# Patient Record
Sex: FEMALE | Race: WHITE | NOT HISPANIC OR LATINO | Employment: FULL TIME
[De-identification: names, ages, dates, MRNs, and addresses within clinical notes are randomized per-mention and may not be internally consistent; named-entity substitution may affect disease eponyms.]

---

## 2017-06-10 ENCOUNTER — HEALTH MAINTENANCE LETTER (OUTPATIENT)
Age: 47
End: 2017-06-10

## 2018-06-16 ENCOUNTER — HEALTH MAINTENANCE LETTER (OUTPATIENT)
Age: 48
End: 2018-06-16

## 2019-01-15 ENCOUNTER — TRANSFERRED RECORDS (OUTPATIENT)
Dept: HEALTH INFORMATION MANAGEMENT | Facility: CLINIC | Age: 49
End: 2019-01-15

## 2019-01-15 LAB
HPV ABSTRACT: NORMAL
PAP-ABSTRACT: NORMAL

## 2019-09-30 ENCOUNTER — HEALTH MAINTENANCE LETTER (OUTPATIENT)
Age: 49
End: 2019-09-30

## 2020-02-11 ENCOUNTER — TRANSFERRED RECORDS (OUTPATIENT)
Dept: HEALTH INFORMATION MANAGEMENT | Facility: CLINIC | Age: 50
End: 2020-02-11

## 2020-02-11 LAB
HPV ABSTRACT: NORMAL
PAP-ABSTRACT: NORMAL

## 2020-02-21 ENCOUNTER — OFFICE VISIT (OUTPATIENT)
Dept: OBGYN | Facility: OTHER | Age: 50
End: 2020-02-21
Attending: OBSTETRICS & GYNECOLOGY
Payer: COMMERCIAL

## 2020-02-21 VITALS
BODY MASS INDEX: 21.97 KG/M2 | HEIGHT: 67 IN | SYSTOLIC BLOOD PRESSURE: 118 MMHG | DIASTOLIC BLOOD PRESSURE: 78 MMHG | HEART RATE: 56 BPM | WEIGHT: 140 LBS

## 2020-02-21 DIAGNOSIS — R87.810 CERVICAL HIGH RISK HPV (HUMAN PAPILLOMAVIRUS) TEST POSITIVE: ICD-10-CM

## 2020-02-21 DIAGNOSIS — Z98.890 STATUS POST COLPOSCOPY: ICD-10-CM

## 2020-02-21 DIAGNOSIS — R87.613 HGSIL ON CYTOLOGIC SMEAR OF CERVIX: ICD-10-CM

## 2020-02-21 PROCEDURE — 57456 ENDOCERV CURETTAGE W/SCOPE: CPT | Performed by: OBSTETRICS & GYNECOLOGY

## 2020-02-21 PROCEDURE — 88305 TISSUE EXAM BY PATHOLOGIST: CPT | Mod: TC | Performed by: OBSTETRICS & GYNECOLOGY

## 2020-02-21 PROCEDURE — 99202 OFFICE O/P NEW SF 15 MIN: CPT | Mod: 25 | Performed by: OBSTETRICS & GYNECOLOGY

## 2020-02-21 RX ORDER — FLUOCINONIDE TOPICAL SOLUTION USP, 0.05% 0.5 MG/ML
SOLUTION TOPICAL
COMMUNITY
Start: 2020-02-11 | End: 2020-02-21

## 2020-02-21 ASSESSMENT — PAIN SCALES - GENERAL: PAINLEVEL: NO PAIN (0)

## 2020-02-21 ASSESSMENT — MIFFLIN-ST. JEOR: SCORE: 1297.67

## 2020-02-21 NOTE — NURSING NOTE
"Chief Complaint   Patient presents with     Consult     Jatinder       Initial /78   Pulse 56   Ht 1.702 m (5' 7\")   Wt 63.5 kg (140 lb)   BMI 21.93 kg/m   Estimated body mass index is 21.93 kg/m  as calculated from the following:    Height as of this encounter: 1.702 m (5' 7\").    Weight as of this encounter: 63.5 kg (140 lb).  Medication Reconciliation: complete  Suzanne Hoover LPN    "

## 2020-02-21 NOTE — PROGRESS NOTES
CONSULT for Aarti Villanueva for COLPO due to HGSIL pap and HRHpv+      S:   Patient has HGSIL pap done 2/2020.  She also has a HRhpv+ 2/2020  Previous pap 1/2019 was normal.  However, she had a HRhpv+ 1/2019  She denies any previous hx of cervical disease or procedures.  She is for COLPO  She has no complaints.      Natasha Rios is a 49 year old female who presents for: initial colposcopy.    Pap smear 1 months ago showed: HGSIL and with high risk HPV present.   The prior pap showed normal and with high risk HPV present: 1/2019.     No LMP recorded.   UPT today is not done    PMHx/Fam hx/PSHX reviewed and unchanged from EMR.    ROS:  Neg GI/    Patient does not smoke    Type of contraception: none  Past GYN history:  No STD history  Prior cervical/vaginal disease: none.  Tobacco no      PROCEDURE:  Before the procedure, it was ensured that the patient was educated regarding the nature of her findings to date, the implications, and what was to be done. She has been made aware of the role of HPV, the natural history of infection, ways to minimize her future risk, the effect of HPV on the cervix, and treatment options available should they be indicated.  The details of the colposcopic procedure were reviewed.  All questions were answered before proceeding, and informed consent was therefore obtained.    Speculum placed in vagina and excellent visualization of cervix   acheived, cervix swabbed x 3 with acetic acid.     Colposcopy was satisfactory and the entire transformation zone seen.    FINDINGS:  Cervix: abnormal vessels at 10, 12, 1  Unable to biopsy due to shape of cervix and biopsy instrument would not grab.    Pap repeated?:  Yes Rigorous ECC  SCJ seen?:  yes    ECC done?:  Yes via rigorous pap ECC  Satisfactory examination?:  yes      ASSESSMENT:    HGSIL pap  HRhpv+ since 1/2019  Abnormality of COLPO exam        PLAN:    Multilevel LEEP  3/26/2020  Immune stimulation principles discussed.  Condom  use for one full year, then will retest HRhpv.  preop scheduled  3/25/2020  Discussed LEEP in detail.

## 2020-02-24 ENCOUNTER — PREP FOR PROCEDURE (OUTPATIENT)
Dept: OBGYN | Facility: OTHER | Age: 50
End: 2020-02-24

## 2020-02-24 ENCOUNTER — HOSPITAL ENCOUNTER (OUTPATIENT)
Facility: HOSPITAL | Age: 50
End: 2020-02-24
Attending: OBSTETRICS & GYNECOLOGY | Admitting: OBSTETRICS & GYNECOLOGY
Payer: COMMERCIAL

## 2020-02-24 DIAGNOSIS — R87.613 HGSIL ON PAP SMEAR OF CERVIX: ICD-10-CM

## 2020-02-24 DIAGNOSIS — R87.613 HGSIL ON PAP SMEAR OF CERVIX: Primary | ICD-10-CM

## 2020-02-25 LAB — COPATH REPORT: NORMAL

## 2020-03-20 ENCOUNTER — ANESTHESIA EVENT (OUTPATIENT)
Dept: SURGERY | Facility: HOSPITAL | Age: 50
End: 2020-03-20

## 2020-03-20 RX ORDER — LABETALOL 20 MG/4 ML (5 MG/ML) INTRAVENOUS SYRINGE
10
Status: CANCELLED | OUTPATIENT
Start: 2020-03-20

## 2020-03-20 RX ORDER — SODIUM CHLORIDE, SODIUM LACTATE, POTASSIUM CHLORIDE, CALCIUM CHLORIDE 600; 310; 30; 20 MG/100ML; MG/100ML; MG/100ML; MG/100ML
INJECTION, SOLUTION INTRAVENOUS CONTINUOUS
Status: CANCELLED | OUTPATIENT
Start: 2020-03-20

## 2020-03-20 RX ORDER — NALOXONE HYDROCHLORIDE 0.4 MG/ML
.1-.4 INJECTION, SOLUTION INTRAMUSCULAR; INTRAVENOUS; SUBCUTANEOUS
Status: CANCELLED | OUTPATIENT
Start: 2020-03-20 | End: 2020-03-21

## 2020-03-20 RX ORDER — MEPERIDINE HYDROCHLORIDE 25 MG/ML
12.5 INJECTION INTRAMUSCULAR; INTRAVENOUS; SUBCUTANEOUS
Status: CANCELLED | OUTPATIENT
Start: 2020-03-20

## 2020-03-20 RX ORDER — HYDROMORPHONE HYDROCHLORIDE 1 MG/ML
.3-.5 INJECTION, SOLUTION INTRAMUSCULAR; INTRAVENOUS; SUBCUTANEOUS EVERY 10 MIN PRN
Status: CANCELLED | OUTPATIENT
Start: 2020-03-20

## 2020-03-20 RX ORDER — ONDANSETRON 4 MG/1
4 TABLET, ORALLY DISINTEGRATING ORAL EVERY 30 MIN PRN
Status: CANCELLED | OUTPATIENT
Start: 2020-03-20

## 2020-03-20 RX ORDER — FENTANYL CITRATE 50 UG/ML
25-50 INJECTION, SOLUTION INTRAMUSCULAR; INTRAVENOUS
Status: CANCELLED | OUTPATIENT
Start: 2020-03-20

## 2020-03-20 RX ORDER — ALBUTEROL SULFATE 0.83 MG/ML
2.5 SOLUTION RESPIRATORY (INHALATION) EVERY 4 HOURS PRN
Status: CANCELLED | OUTPATIENT
Start: 2020-03-20

## 2020-03-20 RX ORDER — ONDANSETRON 2 MG/ML
4 INJECTION INTRAMUSCULAR; INTRAVENOUS EVERY 30 MIN PRN
Status: CANCELLED | OUTPATIENT
Start: 2020-03-20

## 2020-03-20 RX ORDER — LIDOCAINE 40 MG/G
CREAM TOPICAL
Status: CANCELLED | OUTPATIENT
Start: 2020-03-20

## 2020-03-20 NOTE — ANESTHESIA PREPROCEDURE EVALUATION
Anesthesia Pre-Procedure Evaluation    Patient: Natasha Rios   MRN: [id number] : 1970          Preoperative Diagnosis: HGSIL on Pap smear of cervix [R87.613]    Procedure(s):  loop electrosurgical excision procedure    Past Medical History:   Diagnosis Date     Abnormal Pap smear     at age 21 yrs, had colposcopy with biospy. Normal      Anxiety     on citalopram since      Basal cell cancer     removed forehead     Rosacea      S/P breast implant, saline     normal mammogram in      Past Surgical History:   Procedure Laterality Date     C NONSPECIFIC PROCEDURE  2006    breast implants      CL AFF SURGICAL PATHOLOGY      at age 21 yrs, for abnormal pap. normal psp since then     LASIK       TONSILLECTOMY         Anesthesia Evaluation     . Pt has had prior anesthetic.            ROS/MED HX    ENT/Pulmonary:  - neg pulmonary ROS     Neurologic:  - neg neurologic ROS     Cardiovascular:     (+) Dyslipidemia, ----. : . . . :. .       METS/Exercise Tolerance:     Hematologic:         Musculoskeletal:  - neg musculoskeletal ROS       GI/Hepatic:  - neg GI/hepatic ROS       Renal/Genitourinary:  - ROS Renal section negative       Endo:  - neg endo ROS       Psychiatric:     (+) psychiatric history anxiety      Infectious Disease:         Malignancy:   (+) Malignancy History of Skin  Skin CA status post Surgery,         Other:                                 Lab Results   Component Value Date    WBC 4.3 10/24/2013    HGB 14.5 10/24/2013    HCT 42.8 10/24/2013     10/24/2013     10/24/2013    POTASSIUM 4.3 10/24/2013    CHLORIDE 107 10/24/2013    CO2 26 10/24/2013    BUN 14 10/24/2013    CR 0.72 10/24/2013    GLC 92 10/24/2013    MANUEL 9.3 10/24/2013    ALBUMIN 4.2 10/24/2013    PROTTOTAL 7.2 10/24/2013    ALT 29 10/24/2013    AST 22 10/24/2013    GGT 12 10/24/2013    ALKPHOS 44 10/24/2013    BILITOTAL 0.8 10/24/2013    LIPASE 67 10/24/2013    PTT 30 10/24/2013    INR 1.14 10/24/2013  "   TSH 1.76 07/27/2009    HCGS Negative 10/24/2013       Preop Vitals  BP Readings from Last 3 Encounters:   02/21/20 118/78   10/24/13 101/67   10/24/13 117/71    Pulse Readings from Last 3 Encounters:   02/21/20 56   10/24/13 68   10/24/13 63      Resp Readings from Last 3 Encounters:   10/24/13 20    SpO2 Readings from Last 3 Encounters:   10/24/13 100%   10/24/13 100%      Temp Readings from Last 1 Encounters:   10/24/13 97.6  F (36.4  C) (Oral)    Ht Readings from Last 1 Encounters:   02/21/20 1.702 m (5' 7\")      Wt Readings from Last 1 Encounters:   02/21/20 63.5 kg (140 lb)    Estimated body mass index is 21.93 kg/m  as calculated from the following:    Height as of 2/21/20: 1.702 m (5' 7\").    Weight as of 2/21/20: 63.5 kg (140 lb).       Anesthesia Plan          Plan for     Need H and P  Ordered HCG        Postoperative Care      Consents                 Kat Eduardo, APRN CRNA  "

## 2020-03-26 ENCOUNTER — ANESTHESIA (OUTPATIENT)
Dept: SURGERY | Facility: HOSPITAL | Age: 50
End: 2020-03-26

## 2020-05-15 ENCOUNTER — TELEPHONE (OUTPATIENT)
Dept: OBGYN | Facility: OTHER | Age: 50
End: 2020-05-15

## 2020-05-15 NOTE — TELEPHONE ENCOUNTER
Yes please schedule with me first available surgical date and if she is comfortable with proceeding I will talk to her the day of surgery.  Please schedule preop and Covid testing.

## 2020-05-15 NOTE — TELEPHONE ENCOUNTER
Pt. Was scheduled for Leep with Dr. Norman back in March which he deemed necessary due to her HGSIL pap. Pt. Ended up with a low-grade fever so her surgery was cancelled. Pt. Has called several times wanting to get this rescheduled. Will you do the surgery instead? I am here today but if you don't see this until Monday please send to [facility] if you will do the surgery so it can be scheduled.   Thank you  Suzanne Hoover LPN

## 2020-05-18 ENCOUNTER — PREP FOR PROCEDURE (OUTPATIENT)
Dept: OBGYN | Facility: OTHER | Age: 50
End: 2020-05-18

## 2020-05-18 DIAGNOSIS — R87.613 HGSIL ON PAP SMEAR OF CERVIX: Primary | ICD-10-CM

## 2020-06-01 ENCOUNTER — TRANSFERRED RECORDS (OUTPATIENT)
Dept: HEALTH INFORMATION MANAGEMENT | Facility: HOSPITAL | Age: 50
End: 2020-06-01

## 2020-06-04 ENCOUNTER — ANESTHESIA EVENT (OUTPATIENT)
Dept: SURGERY | Facility: HOSPITAL | Age: 50
End: 2020-06-04
Payer: COMMERCIAL

## 2020-06-04 NOTE — ANESTHESIA PREPROCEDURE EVALUATION
Anesthesia Pre-Procedure Evaluation    Patient: Natasha Rios   MRN: [id number] : 1970          Preoperative Diagnosis: HGSIL on Pap smear of cervix [R87.613]    Procedure(s):  LOOP ELECTROSURGICAL EXCISION PROCEDURE    Past Medical History:   Diagnosis Date     Abnormal Pap smear     at age 21 yrs, had colposcopy with biospy. Normal      Anxiety     on citalopram since      Basal cell cancer     removed forehead     Rosacea      S/P breast implant, saline     normal mammogram in      Past Surgical History:   Procedure Laterality Date     C NONSPECIFIC PROCEDURE  2006    breast implants      CL AFF SURGICAL PATHOLOGY      at age 21 yrs, for abnormal pap. normal psp since then     LASIK       TONSILLECTOMY         Anesthesia Evaluation     . Pt has had prior anesthetic. Type: General           ROS/MED HX    ENT/Pulmonary:  - neg pulmonary ROS     Neurologic:  - neg neurologic ROS     Cardiovascular:  - neg cardiovascular ROS   (+) ----. : . . . :. . No previous cardiac testing       METS/Exercise Tolerance:  >4 METS   Hematologic:  - neg hematologic  ROS       Musculoskeletal:  - neg musculoskeletal ROS       GI/Hepatic:  - neg GI/hepatic ROS       Renal/Genitourinary:  - ROS Renal section negative       Endo:  - neg endo ROS       Psychiatric:     (+) psychiatric history anxiety      Infectious Disease:  - neg infectious disease ROS       Malignancy:   (+) Malignancy History of Skin  Skin CA status post Surgery,         Other:    (+) No chance of pregnancy C-spine cleared: N/A, no H/O Chronic Pain,no other significant disability                         Physical Exam  Normal systems: cardiovascular, pulmonary and dental    Airway   Mallampati: I  TM distance: >3 FB  Neck ROM: full    Dental     Cardiovascular   Rhythm and rate: regular and normal      Pulmonary    breath sounds clear to auscultation            Lab Results   Component Value Date    WBC 4.3 10/24/2013    HGB 14.5 10/24/2013  "   HCT 42.8 10/24/2013     10/24/2013     10/24/2013    POTASSIUM 4.3 10/24/2013    CHLORIDE 107 10/24/2013    CO2 26 10/24/2013    BUN 14 10/24/2013    CR 0.72 10/24/2013    GLC 92 10/24/2013    MANUEL 9.3 10/24/2013    ALBUMIN 4.2 10/24/2013    PROTTOTAL 7.2 10/24/2013    ALT 29 10/24/2013    AST 22 10/24/2013    GGT 12 10/24/2013    ALKPHOS 44 10/24/2013    BILITOTAL 0.8 10/24/2013    LIPASE 67 10/24/2013    PTT 30 10/24/2013    INR 1.14 10/24/2013    TSH 1.76 07/27/2009    HCGS Negative 10/24/2013       Preop Vitals  BP Readings from Last 3 Encounters:   02/21/20 118/78   10/24/13 101/67   10/24/13 117/71    Pulse Readings from Last 3 Encounters:   02/21/20 56   10/24/13 68   10/24/13 63      Resp Readings from Last 3 Encounters:   10/24/13 20    SpO2 Readings from Last 3 Encounters:   10/24/13 100%   10/24/13 100%      Temp Readings from Last 1 Encounters:   10/24/13 97.6  F (36.4  C) (Oral)    Ht Readings from Last 1 Encounters:   02/21/20 1.702 m (5' 7\")      Wt Readings from Last 1 Encounters:   02/21/20 63.5 kg (140 lb)    Estimated body mass index is 21.93 kg/m  as calculated from the following:    Height as of 2/21/20: 1.702 m (5' 7\").    Weight as of 2/21/20: 63.5 kg (140 lb).       Anesthesia Plan      History & Physical Review  History and physical reviewed and following examination; no interval change.    ASA Status:  2 .        Plan for MAC with Intravenous and Propofol induction. Maintenance will be TIVA.  Reason for MAC:  Deep or markedly invasive procedure (G8) and Extreme anxiety (QS)  PONV prophylaxis:  Ondansetron (or other 5HT-3) and Dexamethasone or Solumedrol  Hp 6/1/20        Postoperative Care  Postoperative pain management:  IV analgesics.      Consents  Anesthetic plan, risks, benefits and alternatives discussed with:  Patient..                 Ethan Godoy, MAX CRNA  "

## 2020-06-07 ENCOUNTER — IMMUNIZATION (OUTPATIENT)
Dept: FAMILY MEDICINE | Facility: OTHER | Age: 50
End: 2020-06-07
Attending: OBSTETRICS & GYNECOLOGY
Payer: COMMERCIAL

## 2020-06-07 DIAGNOSIS — Z01.818 PREOP GENERAL PHYSICAL EXAM: Primary | ICD-10-CM

## 2020-06-07 PROCEDURE — 87635 SARS-COV-2 COVID-19 AMP PRB: CPT | Mod: ZL | Performed by: OBSTETRICS & GYNECOLOGY

## 2020-06-07 PROCEDURE — 99000 SPECIMEN HANDLING OFFICE-LAB: CPT | Performed by: OBSTETRICS & GYNECOLOGY

## 2020-06-07 NOTE — LETTER
June 9, 2020        Natasha LESTER Osbaldo  [address]   [place] [address]    This letter provides a written record that you were tested for COVID-19 on 6/8/20.   Your result was negative.    This means that we didn t find the virus that causes COVID-19 in your sample. A test may show negative when you do actually have the virus. This can happen when the virus is in the early stages of infection, before you feel illness symptoms.    Even if you don t have symptoms, they may still appear. For safety, it s very important to follow these rules.    Keep yourself away from others (self-isolation):      Stay home. Don t go to work, school or anywhere else.     Stay in your own room (and use your own bathroom), if you can.    Stay away from others in your home. No hugging, kissing or shaking hands. No visitors.    Clean  high touch  surfaces often (doorknobs, counters, handles, etc.). Use a household cleaning spray or wipes.    Cover your mouth and nose with a mask, tissue or washcloth to avoid spreading germs.    Wash your hands and face often with soap and water.    Stay in self-isolation until you meet ALL of the guidelines below:    1. You have had no fever for at least 72 hours (that is 3 full days of no fever without the use of medicine that reduces fevers), AND  2. other symptoms (such as cough, shortness of breath) have gotten better, AND  3. at least 10 days have passed since your symptoms first appeared.    Going back to work  Check with your employer for any guidelines to follow for going back to work.    Employers: This document serves as formal notice that your employee tested negative for COVID-19, as of the testing date shown above.    For questions regarding this letter or your Negative COVID-19 result, call [phone] between 8A to 6:30P (M-F) and 10A to 6:30P (weekends).

## 2020-06-08 LAB
SARS-COV-2 RNA SPEC QL NAA+PROBE: NOT DETECTED
SPECIMEN SOURCE: NORMAL

## 2020-06-10 ENCOUNTER — HOSPITAL ENCOUNTER (OUTPATIENT)
Facility: HOSPITAL | Age: 50
Discharge: HOME | End: 2020-06-10
Attending: OBSTETRICS & GYNECOLOGY | Admitting: OBSTETRICS & GYNECOLOGY
Payer: COMMERCIAL

## 2020-06-10 ENCOUNTER — ANESTHESIA (OUTPATIENT)
Dept: SURGERY | Facility: HOSPITAL | Age: 50
End: 2020-06-10
Payer: COMMERCIAL

## 2020-06-10 ENCOUNTER — APPOINTMENT (OUTPATIENT)
Dept: LAB | Facility: HOSPITAL | Age: 50
End: 2020-06-10
Attending: NURSE PRACTITIONER
Payer: COMMERCIAL

## 2020-06-10 VITALS
OXYGEN SATURATION: 100 % | WEIGHT: 135 LBS | HEART RATE: 55 BPM | RESPIRATION RATE: 16 BRPM | SYSTOLIC BLOOD PRESSURE: 134 MMHG | BODY MASS INDEX: 21.19 KG/M2 | TEMPERATURE: 96.9 F | DIASTOLIC BLOOD PRESSURE: 68 MMHG | HEIGHT: 67 IN

## 2020-06-10 DIAGNOSIS — Z98.890 POST-OPERATIVE STATE: Primary | ICD-10-CM

## 2020-06-10 DIAGNOSIS — R87.613 HGSIL ON PAP SMEAR OF CERVIX: ICD-10-CM

## 2020-06-10 LAB — HCG UR QL: NEGATIVE

## 2020-06-10 PROCEDURE — 25000132 ZZH RX MED GY IP 250 OP 250 PS 637: Performed by: OBSTETRICS & GYNECOLOGY

## 2020-06-10 PROCEDURE — 25800030 ZZH RX IP 258 OP 636: Performed by: NURSE ANESTHETIST, CERTIFIED REGISTERED

## 2020-06-10 PROCEDURE — 25000125 ZZHC RX 250: Performed by: OBSTETRICS & GYNECOLOGY

## 2020-06-10 PROCEDURE — 81025 URINE PREGNANCY TEST: CPT | Performed by: NURSE ANESTHETIST, CERTIFIED REGISTERED

## 2020-06-10 PROCEDURE — 25000125 ZZHC RX 250: Performed by: NURSE ANESTHETIST, CERTIFIED REGISTERED

## 2020-06-10 PROCEDURE — 57522 CONIZATION OF CERVIX: CPT | Performed by: NURSE ANESTHETIST, CERTIFIED REGISTERED

## 2020-06-10 PROCEDURE — 25000128 H RX IP 250 OP 636: Performed by: NURSE ANESTHETIST, CERTIFIED REGISTERED

## 2020-06-10 PROCEDURE — 25000128 H RX IP 250 OP 636: Performed by: OBSTETRICS & GYNECOLOGY

## 2020-06-10 PROCEDURE — 88305 TISSUE EXAM BY PATHOLOGIST: CPT | Mod: TC | Performed by: OBSTETRICS & GYNECOLOGY

## 2020-06-10 PROCEDURE — 37000009 ZZH ANESTHESIA TECHNICAL FEE, EACH ADDTL 15 MIN: Performed by: OBSTETRICS & GYNECOLOGY

## 2020-06-10 PROCEDURE — 37000008 ZZH ANESTHESIA TECHNICAL FEE, 1ST 30 MIN: Performed by: OBSTETRICS & GYNECOLOGY

## 2020-06-10 PROCEDURE — 36000050 ZZH SURGERY LEVEL 2 1ST 30 MIN: Performed by: OBSTETRICS & GYNECOLOGY

## 2020-06-10 PROCEDURE — 40000305 ZZH STATISTIC PRE PROC ASSESS I: Performed by: OBSTETRICS & GYNECOLOGY

## 2020-06-10 PROCEDURE — 27210794 ZZH OR GENERAL SUPPLY STERILE: Performed by: OBSTETRICS & GYNECOLOGY

## 2020-06-10 PROCEDURE — 57522 CONIZATION OF CERVIX: CPT | Performed by: OBSTETRICS & GYNECOLOGY

## 2020-06-10 PROCEDURE — 36000052 ZZH SURGERY LEVEL 2 EA 15 ADDTL MIN: Performed by: OBSTETRICS & GYNECOLOGY

## 2020-06-10 PROCEDURE — 71000027 ZZH RECOVERY PHASE 2 EACH 15 MINS: Performed by: OBSTETRICS & GYNECOLOGY

## 2020-06-10 RX ORDER — HYDROMORPHONE HYDROCHLORIDE 1 MG/ML
.3-.5 INJECTION, SOLUTION INTRAMUSCULAR; INTRAVENOUS; SUBCUTANEOUS EVERY 10 MIN PRN
Status: DISCONTINUED | OUTPATIENT
Start: 2020-06-10 | End: 2020-06-10 | Stop reason: HOSPADM

## 2020-06-10 RX ORDER — OXYCODONE HYDROCHLORIDE 5 MG/1
5 TABLET ORAL
Status: DISCONTINUED | OUTPATIENT
Start: 2020-06-10 | End: 2020-06-10 | Stop reason: HOSPADM

## 2020-06-10 RX ORDER — FENTANYL CITRATE 50 UG/ML
25-50 INJECTION, SOLUTION INTRAMUSCULAR; INTRAVENOUS
Status: DISCONTINUED | OUTPATIENT
Start: 2020-06-10 | End: 2020-06-10 | Stop reason: HOSPADM

## 2020-06-10 RX ORDER — MEPERIDINE HYDROCHLORIDE 25 MG/ML
12.5 INJECTION INTRAMUSCULAR; INTRAVENOUS; SUBCUTANEOUS
Status: DISCONTINUED | OUTPATIENT
Start: 2020-06-10 | End: 2020-06-10 | Stop reason: HOSPADM

## 2020-06-10 RX ORDER — IBUPROFEN 600 MG/1
600 TABLET, FILM COATED ORAL EVERY 6 HOURS PRN
Qty: 30 TABLET | Refills: 0 | Status: SHIPPED | OUTPATIENT
Start: 2020-06-10 | End: 2020-07-30

## 2020-06-10 RX ORDER — ONDANSETRON 4 MG/1
4 TABLET, ORALLY DISINTEGRATING ORAL EVERY 30 MIN PRN
Status: DISCONTINUED | OUTPATIENT
Start: 2020-06-10 | End: 2020-06-10 | Stop reason: HOSPADM

## 2020-06-10 RX ORDER — HYDROXYZINE HYDROCHLORIDE 25 MG/1
25 TABLET, FILM COATED ORAL
Status: DISCONTINUED | OUTPATIENT
Start: 2020-06-10 | End: 2020-06-10 | Stop reason: HOSPADM

## 2020-06-10 RX ORDER — PROPOFOL 10 MG/ML
INJECTION, EMULSION INTRAVENOUS PRN
Status: DISCONTINUED | OUTPATIENT
Start: 2020-06-10 | End: 2020-06-10

## 2020-06-10 RX ORDER — NALOXONE HYDROCHLORIDE 0.4 MG/ML
.1-.4 INJECTION, SOLUTION INTRAMUSCULAR; INTRAVENOUS; SUBCUTANEOUS
Status: DISCONTINUED | OUTPATIENT
Start: 2020-06-10 | End: 2020-06-10 | Stop reason: HOSPADM

## 2020-06-10 RX ORDER — LIDOCAINE HYDROCHLORIDE 10 MG/ML
INJECTION, SOLUTION INFILTRATION; PERINEURAL PRN
Status: DISCONTINUED | OUTPATIENT
Start: 2020-06-10 | End: 2020-06-10 | Stop reason: HOSPADM

## 2020-06-10 RX ORDER — FENTANYL CITRATE 50 UG/ML
INJECTION, SOLUTION INTRAMUSCULAR; INTRAVENOUS PRN
Status: DISCONTINUED | OUTPATIENT
Start: 2020-06-10 | End: 2020-06-10

## 2020-06-10 RX ORDER — LIDOCAINE HYDROCHLORIDE 20 MG/ML
INJECTION, SOLUTION INFILTRATION; PERINEURAL PRN
Status: DISCONTINUED | OUTPATIENT
Start: 2020-06-10 | End: 2020-06-10

## 2020-06-10 RX ORDER — SODIUM CHLORIDE, SODIUM LACTATE, POTASSIUM CHLORIDE, CALCIUM CHLORIDE 600; 310; 30; 20 MG/100ML; MG/100ML; MG/100ML; MG/100ML
INJECTION, SOLUTION INTRAVENOUS CONTINUOUS
Status: DISCONTINUED | OUTPATIENT
Start: 2020-06-10 | End: 2020-06-10 | Stop reason: HOSPADM

## 2020-06-10 RX ORDER — KETOROLAC TROMETHAMINE 30 MG/ML
30 INJECTION, SOLUTION INTRAMUSCULAR; INTRAVENOUS ONCE
Status: COMPLETED | OUTPATIENT
Start: 2020-06-10 | End: 2020-06-10

## 2020-06-10 RX ORDER — ONDANSETRON 2 MG/ML
4 INJECTION INTRAMUSCULAR; INTRAVENOUS EVERY 30 MIN PRN
Status: DISCONTINUED | OUTPATIENT
Start: 2020-06-10 | End: 2020-06-10 | Stop reason: HOSPADM

## 2020-06-10 RX ORDER — LIDOCAINE 40 MG/G
CREAM TOPICAL
Status: DISCONTINUED | OUTPATIENT
Start: 2020-06-10 | End: 2020-06-10 | Stop reason: HOSPADM

## 2020-06-10 RX ORDER — ACETAMINOPHEN 325 MG/1
975 TABLET ORAL ONCE
Status: COMPLETED | OUTPATIENT
Start: 2020-06-10 | End: 2020-06-10

## 2020-06-10 RX ORDER — IODINE AND POTASSIUM IODIDE 50; 100 MG/ML; MG/ML
LIQUID ORAL PRN
Status: DISCONTINUED | OUTPATIENT
Start: 2020-06-10 | End: 2020-06-10 | Stop reason: HOSPADM

## 2020-06-10 RX ORDER — ONDANSETRON 4 MG/1
4 TABLET, ORALLY DISINTEGRATING ORAL
Status: DISCONTINUED | OUTPATIENT
Start: 2020-06-10 | End: 2020-06-10 | Stop reason: HOSPADM

## 2020-06-10 RX ADMIN — ACETAMINOPHEN 975 MG: 325 TABLET, FILM COATED ORAL at 08:19

## 2020-06-10 RX ADMIN — PROPOFOL 50 MG: 10 INJECTION, EMULSION INTRAVENOUS at 09:21

## 2020-06-10 RX ADMIN — SODIUM CHLORIDE, POTASSIUM CHLORIDE, SODIUM LACTATE AND CALCIUM CHLORIDE: 600; 310; 30; 20 INJECTION, SOLUTION INTRAVENOUS at 08:17

## 2020-06-10 RX ADMIN — KETOROLAC TROMETHAMINE 30 MG: 30 INJECTION, SOLUTION INTRAMUSCULAR; INTRAVENOUS at 08:19

## 2020-06-10 RX ADMIN — PROPOFOL 20 MG: 10 INJECTION, EMULSION INTRAVENOUS at 09:38

## 2020-06-10 RX ADMIN — PROPOFOL 50 MG: 10 INJECTION, EMULSION INTRAVENOUS at 09:24

## 2020-06-10 RX ADMIN — FENTANYL CITRATE 100 MCG: 50 INJECTION, SOLUTION INTRAMUSCULAR; INTRAVENOUS at 09:07

## 2020-06-10 RX ADMIN — MIDAZOLAM 2 MG: 1 INJECTION INTRAMUSCULAR; INTRAVENOUS at 09:07

## 2020-06-10 RX ADMIN — PROPOFOL 20 MG: 10 INJECTION, EMULSION INTRAVENOUS at 09:42

## 2020-06-10 RX ADMIN — PROPOFOL 50 MG: 10 INJECTION, EMULSION INTRAVENOUS at 09:15

## 2020-06-10 RX ADMIN — PROPOFOL 50 MG: 10 INJECTION, EMULSION INTRAVENOUS at 09:18

## 2020-06-10 RX ADMIN — PROPOFOL 20 MG: 10 INJECTION, EMULSION INTRAVENOUS at 09:34

## 2020-06-10 RX ADMIN — LIDOCAINE HYDROCHLORIDE 40 MG: 20 INJECTION, SOLUTION INFILTRATION; PERINEURAL at 09:11

## 2020-06-10 RX ADMIN — PROPOFOL 50 MG: 10 INJECTION, EMULSION INTRAVENOUS at 09:30

## 2020-06-10 RX ADMIN — PROPOFOL 50 MG: 10 INJECTION, EMULSION INTRAVENOUS at 09:11

## 2020-06-10 ASSESSMENT — MIFFLIN-ST. JEOR: SCORE: 1274.99

## 2020-06-10 NOTE — ANESTHESIA POSTPROCEDURE EVALUATION
Patient: Natasha Rios    Procedure(s):  LOOP ELECTROSURGICAL EXCISION PROCEDURE    Diagnosis:HGSIL on Pap smear of cervix [R87.613]  Diagnosis Additional Information: No value filed.    Anesthesia Type:  MAC    Note:  Anesthesia Post Evaluation    Patient location during evaluation: Phase 2  Patient participation: Able to fully participate in evaluation  Level of consciousness: awake and alert  Pain management: adequate  Airway patency: patent  Cardiovascular status: acceptable  Respiratory status: acceptable  Hydration status: stable  PONV: none     Anesthetic complications: None          Last vitals:  Vitals:    06/10/20 1025 06/10/20 1030 06/10/20 1035   BP: 105/85 130/78 134/68   Pulse: 66 53 55   Resp: 16 16 16   Temp:   96.9  F (36.1  C)   SpO2: 100% 99% 100%         Electronically Signed By: MAX Posada CRNA  Jacquelyn 10, 2020  11:05 AM

## 2020-06-10 NOTE — ANESTHESIA CARE TRANSFER NOTE
Patient: Natasha Rios    Procedure(s):  LOOP ELECTROSURGICAL EXCISION PROCEDURE    Diagnosis: HGSIL on Pap smear of cervix [R87.613]  Diagnosis Additional Information: No value filed.    Anesthesia Type:   MAC     Note:  Airway :Nasal Cannula  Patient transferred to:Phase II  Handoff Report: Identifed the Patient, Identified the Reponsible Provider, Reviewed the pertinent medical history, Discussed the surgical course, Reviewed Intra-OP anesthesia mangement and issues during anesthesia, Set expectations for post-procedure period and Allowed opportunity for questions and acknowledgement of understanding      Vitals: (Last set prior to Anesthesia Care Transfer)    CRNA VITALS  6/10/2020 0919 - 6/10/2020 0958      6/10/2020             Resp Rate (set):  8                Electronically Signed By: MAX Posada CRNA  Jacquelyn 10, 2020  9:58 AM

## 2020-06-10 NOTE — INTERVAL H&P NOTE
History and physical and previous evaluations by Dr. Norman reviewed on 6/10/2020.  Patient examined. No interval change in condition.  History and physical reviewed on 6/10/2020.   Patient examined. No interval change in condition.  Reviewed goals, risks, alternatives for planned procedure.  Including risk of bleeding, infection, damage to nerves, blood vessels, bowel and bladder. Discussed recovery period and expected discomfort.      Consent form reviewed with patient and signed.  All questions answered.  Pt desires to proceed.          Leo Alonzo MD  9:18 AM

## 2020-06-10 NOTE — DISCHARGE INSTRUCTIONS
Call MD prior to DC.  No driving today or while on pain meds  Pelvic rest for 4 weeks  Call Dr. Alonzo [phone] as necessary if problems in interim, no post op appt needed.          Post-Anesthesia Patient Instructions    IMMEDIATELY FOLLOWING SURGERY:  Do not drive or operate machinery for the first twenty four hours after surgery.  Do not make any important decisions for twenty four hours after surgery or while taking narcotic pain medications or sedatives.  If you develop intractable nausea and vomiting or a severe headache please notify your doctor immediately.    FOLLOW-UP:  Please make an appointment with your surgeon as instructed. You do not need to follow up with anesthesia unless specifically instructed to do so.    WOUND CARE INSTRUCTIONS (if applicable):  Keep a dry clean dressing on the anesthesia/puncture wound site if there is drainage.  Once the wound has quit draining you may leave it open to air.  Generally you should leave the bandage intact for twenty four hours unless there is drainage.  If the epidural site drains for more than 36-48 hours please call the anesthesia department.    QUESTIONS?:  Please feel free to call your physician or the hospital  if you have any questions, and they will be happy to assist you.

## 2020-06-10 NOTE — OP NOTE
[facility]  Operative Note    Pre-operative diagnosis: HGSIL on Pap smear of cervix [R87.613]   Post-operative diagnosis Same, Pathology Pending   Procedure: Procedure(s):  LOOP ELECTROSURGICAL EXCISION PROCEDURE   Surgeon:  Assistant: Leo Alonzo MD  None   Anesthesia: Combined MAC with Local, Local paracervical block    Estimated blood loss: Less than 10 ml       Drains: None   Specimens: LEEP cut at 12 O'clock.  LEEP endocervical pass  ECC   Findings: Nonstaining areas of cervical dysplasia with application of Lugol's solutions   Complications: None   Condition: Stable       OPERATIVE DICTATION:  The patientwas brought to the operating room and uneventfully placed under  anesthesia. She was prepped and draped in the dorsal lithotomy position and her bladder drained. A coated LEEP speculum was placed to visualize the cervix. The cervix was soaked with dilute acetic acid and then Lugol solution to identify the areas of dysplasia. A cervical/paracervical block was performed with 1% Lidocaine.   A 2 cm LEEP wand was chosen and using 50 shane of pure cutting current, a LEEP procedure performed in 2 passes. ECC was performed. Rollerball cautery was used at the margins and Monsel solution was applied to the cervical bed with resulting excellent hemostasis. The instruments were removed. There were no complications and the patient was transferred to the recovery room in excellent stable condition.   Leo Alonzo MD  6/10/2020  9:19 AM

## 2020-06-10 NOTE — OR NURSING
Patient and responsible adult given discharge instructions with no questions regarding instructions. Esther score 20. Pain level 0/10.  Discharged from unit via wheelchair. Patient discharged to home with friend mitzi.

## 2020-06-11 LAB — COPATH REPORT: NORMAL

## 2020-07-22 ENCOUNTER — TELEPHONE (OUTPATIENT)
Dept: OBGYN | Facility: OTHER | Age: 50
End: 2020-07-22

## 2020-07-22 NOTE — TELEPHONE ENCOUNTER
Pt had LEEP in June 2020. States she has been spotting, brown to red sometimes with clots, on and off since 7/11/20. Denies any vaginal discharge, itching or burning. Denies pain or cramping. LMP was 6/22/20. Please advise

## 2020-07-30 ENCOUNTER — OFFICE VISIT (OUTPATIENT)
Dept: OBGYN | Facility: OTHER | Age: 50
End: 2020-07-30
Attending: OBSTETRICS & GYNECOLOGY
Payer: COMMERCIAL

## 2020-07-30 VITALS
BODY MASS INDEX: 21.19 KG/M2 | HEART RATE: 61 BPM | TEMPERATURE: 98.8 F | OXYGEN SATURATION: 98 % | SYSTOLIC BLOOD PRESSURE: 118 MMHG | HEIGHT: 67 IN | WEIGHT: 135 LBS | DIASTOLIC BLOOD PRESSURE: 70 MMHG

## 2020-07-30 DIAGNOSIS — Z98.890 POST-OPERATIVE STATE: Primary | ICD-10-CM

## 2020-07-30 PROCEDURE — 99024 POSTOP FOLLOW-UP VISIT: CPT | Performed by: OBSTETRICS & GYNECOLOGY

## 2020-07-30 ASSESSMENT — PAIN SCALES - GENERAL: PAINLEVEL: NO PAIN (0)

## 2020-07-30 ASSESSMENT — MIFFLIN-ST. JEOR: SCORE: 1274.99

## 2020-07-30 NOTE — PROGRESS NOTES
"SIRENA Rios is a 49 year old female presents for post operative check. She is  6  week(s) status post LEEP.  She reports doing well and denies significant pain.  She did have some AUB From Jacquelyn 10 following intercourse and up until her next period. Bowel and bladder function is satisfactory.  Significant findings CIN3    O.  Blood pressure 118/70, pulse 61, temperature 98.8  F (37.1  C), temperature source Tympanic, height 1.702 m (5' 7\"), weight 61.2 kg (135 lb), SpO2 98 %.    Abd: soft, non-tender, non-distended.Cervix:  clear,  intact without evidence of infection.  No lesions.      A. /P. Satisfactory post-op check.  Released from restrictions.    F/u prn problems or in 4 months for f/u Pap smear.     Leo Alonzo MD  "

## 2020-07-30 NOTE — NURSING NOTE
"Chief Complaint   Patient presents with     Follow Up     pt had LEEP on 6/10/20 and has been having spotting for about a month       Initial /70 (BP Location: Left arm, Cuff Size: Adult Regular)   Pulse 61   Temp 98.8  F (37.1  C) (Tympanic)   Ht 1.702 m (5' 7\")   Wt 61.2 kg (135 lb)   SpO2 98%   BMI 21.14 kg/m   Estimated body mass index is 21.14 kg/m  as calculated from the following:    Height as of this encounter: 1.702 m (5' 7\").    Weight as of this encounter: 61.2 kg (135 lb).  Medication Reconciliation: complete  Jewels Izaguirre LPN  "

## 2020-12-08 ENCOUNTER — OFFICE VISIT (OUTPATIENT)
Dept: OBGYN | Facility: OTHER | Age: 50
End: 2020-12-08
Attending: OBSTETRICS & GYNECOLOGY
Payer: COMMERCIAL

## 2020-12-08 VITALS
DIASTOLIC BLOOD PRESSURE: 63 MMHG | OXYGEN SATURATION: 100 % | HEART RATE: 49 BPM | WEIGHT: 135 LBS | BODY MASS INDEX: 21.19 KG/M2 | HEIGHT: 67 IN | SYSTOLIC BLOOD PRESSURE: 100 MMHG

## 2020-12-08 DIAGNOSIS — D06.9 SEVERE DYSPLASIA OF CERVIX: ICD-10-CM

## 2020-12-08 DIAGNOSIS — N93.9 VAGINAL BLEEDING: Primary | ICD-10-CM

## 2020-12-08 LAB
SPECIMEN SOURCE: NORMAL
WET PREP SPEC: NORMAL

## 2020-12-08 PROCEDURE — 99213 OFFICE O/P EST LOW 20 MIN: CPT | Performed by: OBSTETRICS & GYNECOLOGY

## 2020-12-08 PROCEDURE — 87624 HPV HI-RISK TYP POOLED RSLT: CPT | Performed by: OBSTETRICS & GYNECOLOGY

## 2020-12-08 PROCEDURE — 87210 SMEAR WET MOUNT SALINE/INK: CPT | Performed by: OBSTETRICS & GYNECOLOGY

## 2020-12-08 PROCEDURE — 88142 CYTOPATH C/V THIN LAYER: CPT | Mod: TC | Performed by: OBSTETRICS & GYNECOLOGY

## 2020-12-08 ASSESSMENT — PAIN SCALES - GENERAL: PAINLEVEL: NO PAIN (0)

## 2020-12-08 ASSESSMENT — MIFFLIN-ST. JEOR: SCORE: 1274.99

## 2020-12-08 NOTE — PROGRESS NOTES
"S:  F/u cervical DPA  See my prior evals.  Pt 6 months s/p LEEP for CIN3.  Denies pelvic pain, abnormal discharge.  Some spotting prior to menses which are regular.  No other co's.         Patient Active Problem List   Diagnosis     Rosacea     CARDIOVASCULAR SCREENING; LDL GOAL LESS THAN 160     Anxiety     HGSIL on cytologic smear of cervix--pap normal-1/2019, Now HGSIL--2/2020     Cervical high risk HPV (human papillomavirus) test positive-HRhpv+---1/2019, 2/2020     Status post colposcopy for HGSIL & HRhpv+--2/2020,   ECC is negative---2/2020     HGSIL on Pap smear of cervix            Past Medical History:   Diagnosis Date     Abnormal Pap smear     at age 21 yrs, had colposcopy with biospy. Normal      Anxiety     on citalopram since 2010     Basal cell cancer 2013    removed forehead     Rosacea      S/P breast implant, saline 2006    normal mammogram in 2007            Past Surgical History:   Procedure Laterality Date     CL AFF SURGICAL PATHOLOGY      at age 21 yrs, for abnormal pap. normal psp since then     CONIZATION LEEP N/A 6/10/2020    Procedure: LOOP ELECTROSURGICAL EXCISION PROCEDURE;  Surgeon: Leo Alonzo MD;  Location: [facility]     LASIK       TONSILLECTOMY       [facility]  2006    breast implants             Social History     Tobacco Use     Smoking status: Never Smoker     Smokeless tobacco: Never Used   Substance Use Topics     Alcohol use: Not on file            Family History   Problem Relation Age of Onset     Cancer Mother         liver             No Known Allergies         No current outpatient medications on file.          Review Of Systems  Constitutional:  Denies fever  GI/ negative except as noted per hpi    O:   /63 (BP Location: Left arm, Cuff Size: Adult Regular)   Pulse (!) 49   Ht 1.702 m (5' 7\")   Wt 61.2 kg (135 lb)   SpO2 100%   BMI 21.14 kg/m    Gen:  NAD, A and O    Abd soft, NT, ND  Lymphadenopathy:  neg  Vulva: No lesions, erythema, BUS " wnl  Vagina: Pink, moist mucosa with rugae,no lesions  Cervix: No lesions, Friable with Pap.  Anus without lesions  Ext.  neg    Pap/wet prep done done         A:  Surveillance, cervical DPA.  H/o HGSIL.  Intermenstrual bleeding,      P:  Await results of testing.  If nml repeat 6 months.  Otherwise colposcopy if indicated based on results.   Pt has my card and phone number to call as needed if problems in the interim or she does not hear her results.

## 2020-12-08 NOTE — NURSING NOTE
"Chief Complaint   Patient presents with     Follow Up     pap smear       Initial /63 (BP Location: Left arm, Cuff Size: Adult Regular)   Pulse (!) 49   Ht 1.702 m (5' 7\")   Wt 61.2 kg (135 lb)   SpO2 100%   BMI 21.14 kg/m   Estimated body mass index is 21.14 kg/m  as calculated from the following:    Height as of this encounter: 1.702 m (5' 7\").    Weight as of this encounter: 61.2 kg (135 lb).  Medication Reconciliation: complete  Jewels Izaguirre LPN  "

## 2020-12-08 NOTE — LETTER
December 15, 2020          Natasha Rios  [address]   [place] [address]        Dear Natasha,      Thank you for coming to the [facility]. This letter is to inform you that your pap test and HPV was normal. Please call the nurse at [phone], if you have questions pertaining to your results.                     Sincerely,      Leo Alonzo MD/ [facility]

## 2020-12-11 LAB
COPATH REPORT: NORMAL
PAP: NORMAL

## 2020-12-15 LAB
FINAL DIAGNOSIS: NORMAL
HPV HR 12 DNA CVX QL NAA+PROBE: NEGATIVE
HPV16 DNA SPEC QL NAA+PROBE: NEGATIVE
HPV18 DNA SPEC QL NAA+PROBE: NEGATIVE
SPECIMEN DESCRIPTION: NORMAL
SPECIMEN SOURCE CVX/VAG CYTO: NORMAL

## 2021-01-15 ENCOUNTER — HEALTH MAINTENANCE LETTER (OUTPATIENT)
Age: 51
End: 2021-01-15

## 2021-01-23 ENCOUNTER — HEALTH MAINTENANCE LETTER (OUTPATIENT)
Age: 51
End: 2021-01-23

## 2021-06-18 ENCOUNTER — OFFICE VISIT (OUTPATIENT)
Dept: OBGYN | Facility: OTHER | Age: 51
End: 2021-06-18
Attending: OBSTETRICS & GYNECOLOGY
Payer: COMMERCIAL

## 2021-06-18 VITALS
BODY MASS INDEX: 21.19 KG/M2 | DIASTOLIC BLOOD PRESSURE: 60 MMHG | OXYGEN SATURATION: 98 % | SYSTOLIC BLOOD PRESSURE: 100 MMHG | HEIGHT: 67 IN | HEART RATE: 57 BPM | WEIGHT: 135 LBS

## 2021-06-18 DIAGNOSIS — D06.9 SEVERE DYSPLASIA OF CERVIX: Primary | ICD-10-CM

## 2021-06-18 PROCEDURE — 88142 CYTOPATH C/V THIN LAYER: CPT | Performed by: OBSTETRICS & GYNECOLOGY

## 2021-06-18 PROCEDURE — 99213 OFFICE O/P EST LOW 20 MIN: CPT | Performed by: OBSTETRICS & GYNECOLOGY

## 2021-06-18 PROCEDURE — 87624 HPV HI-RISK TYP POOLED RSLT: CPT | Performed by: OBSTETRICS & GYNECOLOGY

## 2021-06-18 ASSESSMENT — PAIN SCALES - GENERAL: PAINLEVEL: NO PAIN (0)

## 2021-06-18 ASSESSMENT — MIFFLIN-ST. JEOR: SCORE: 1269.99

## 2021-06-18 NOTE — PROGRESS NOTES
"Natasha Rios is a 50 year old female   who presents surveillance pa smear for h/o CIN3.      Pap smear 6 months ago showed: normal. The prior pap showed HGSIL.  Subsequent LEEP with DORIE 3 on 6/20.    She denies AUB, pelvic pain or GYN complaints.  See my prior evals.       PMH, Fam Hx, Soc Hx Reviewed.    ROS:  Neg GI/      FINDINGS:  EXAM:  Blood pressure 100/60, pulse 57, height 1.702 m (5' 7\"), weight 61.2 kg (135 lb), SpO2 98 %.  BMI= Body mass index is 21.14 kg/m .  No LMP recorded.  General - pleasant female in no acute distress.  Neurological - alert and oriented X 3  Psychiatric - normal mood and affect    Pelvic- nml V, V, C.   Cervix:  No lesions, pap done.           ASSESSMENT: h/o DORIE 3. S/p LEEP .  Surveillance Pap smear.     PLAN:     Discussed cervical dysplasia/HPV, importance of follow up.  Handouts and my card and phone number given to patient.  She will call if she has not heard results within 2 weeks.    F/u Pap 1 year if nml.      Leo Alonzo MD  "

## 2021-06-18 NOTE — NURSING NOTE
"Chief Complaint   Patient presents with     Follow Up     follow up pap smear       Initial /60 (BP Location: Left arm, Cuff Size: Adult Regular)   Pulse 57   Ht 1.702 m (5' 7\")   Wt 61.2 kg (135 lb)   SpO2 98%   BMI 21.14 kg/m   Estimated body mass index is 21.14 kg/m  as calculated from the following:    Height as of this encounter: 1.702 m (5' 7\").    Weight as of this encounter: 61.2 kg (135 lb).  Medication Reconciliation: complete  Jewels Izaguirre LPN  "

## 2021-06-23 LAB
COPATH REPORT: NORMAL
PAP: NORMAL

## 2021-08-31 ENCOUNTER — TRANSFERRED RECORDS (OUTPATIENT)
Dept: HEALTH INFORMATION MANAGEMENT | Facility: HOSPITAL | Age: 51
End: 2021-08-31
Payer: COMMERCIAL

## 2021-10-24 ENCOUNTER — HEALTH MAINTENANCE LETTER (OUTPATIENT)
Age: 51
End: 2021-10-24

## 2022-01-28 ENCOUNTER — ANESTHESIA EVENT (OUTPATIENT)
Dept: SURGERY | Facility: HOSPITAL | Age: 52
End: 2022-01-28
Payer: COMMERCIAL

## 2022-01-28 NOTE — ANESTHESIA PREPROCEDURE EVALUATION
Anesthesia Pre-Procedure Evaluation    Patient: Natasha Rios   MRN: [id number] : 1970        Preoperative Diagnosis: Screen for colon cancer [Z12.11]    Procedure : Procedure(s):  COLONOSCOPY          Past Medical History:   Diagnosis Date     Abnormal Pap smear     at age 21 yrs, had colposcopy with biospy. Normal      Anxiety     on citalopram since      Basal cell cancer     removed forehead     Rosacea      S/P breast implant, saline     normal mammogram in       Past Surgical History:   Procedure Laterality Date     CL AFF SURGICAL PATHOLOGY      at age 21 yrs, for abnormal pap. normal psp since then     CONIZATION LEEP N/A 6/10/2020    Procedure: LOOP ELECTROSURGICAL EXCISION PROCEDURE;  Surgeon: Leo Alonzo MD;  Location: [facility]     LASIK       TONSILLECTOMY       Z NONSPECIFIC PROCEDURE  2006    breast implants       No Known Allergies   Social History     Tobacco Use     Smoking status: Never Smoker     Smokeless tobacco: Never Used   Substance Use Topics     Alcohol use: Not on file      Wt Readings from Last 1 Encounters:   21 61.2 kg (135 lb)        Anesthesia Evaluation   Pt has had prior anesthetic. Type: General and MAC.    No history of anesthetic complications       ROS/MED HX  ENT/Pulmonary:  - neg pulmonary ROS     Neurologic:  - neg neurologic ROS     Cardiovascular:  - neg cardiovascular ROS     METS/Exercise Tolerance: >4 METS    Hematologic:  - neg hematologic  ROS     Musculoskeletal:  - neg musculoskeletal ROS     GI/Hepatic:     (+) bowel prep,     Renal/Genitourinary:  - neg Renal ROS     Endo:  - neg endo ROS     Psychiatric/Substance Use:     (+) psychiatric history anxiety     Infectious Disease:  - neg infectious disease ROS     Malignancy:   (+) Malignancy, History of Skin.Skin CA status post Surgery.        Other:  - neg other ROS          Physical Exam    Airway  airway exam normal      Mallampati: II   TM distance: > 3 FB   Neck ROM: full    Mouth opening: > 3 cm    Respiratory Devices and Support         Dental  no notable dental history         Cardiovascular   cardiovascular exam normal       Rhythm and rate: regular and normal     Pulmonary   pulmonary exam normal        breath sounds clear to auscultation           OUTSIDE LABS:  CBC:   Lab Results   Component Value Date    WBC 4.3 10/24/2013    WBC 6.0 07/27/2009    HGB 14.5 10/24/2013    HGB 15.7 07/27/2009    HCT 42.8 10/24/2013    HCT 47.6 (H) 07/27/2009     10/24/2013     07/27/2009     BMP:   Lab Results   Component Value Date     10/24/2013     07/27/2009    POTASSIUM 4.3 10/24/2013    POTASSIUM 4.3 07/27/2009    CHLORIDE 107 10/24/2013    CHLORIDE 102 07/27/2009    CO2 26 10/24/2013    CO2 25 07/27/2009    BUN 14 10/24/2013    BUN 17 07/27/2009    CR 0.72 10/24/2013    CR 0.77 07/27/2009    GLC 92 10/24/2013    GLC 76 07/27/2009     COAGS:   Lab Results   Component Value Date    PTT 30 10/24/2013    INR 1.14 10/24/2013     POC:   Lab Results   Component Value Date    HCG Negative 06/10/2020    HCGS Negative 10/24/2013     HEPATIC:   Lab Results   Component Value Date    ALBUMIN 4.2 10/24/2013    PROTTOTAL 7.2 10/24/2013    ALT 29 10/24/2013    AST 22 10/24/2013    GGT 12 10/24/2013    ALKPHOS 44 10/24/2013    BILITOTAL 0.8 10/24/2013     OTHER:   Lab Results   Component Value Date    MANUEL 9.3 10/24/2013    LIPASE 67 10/24/2013    TSH 1.76 07/27/2009       Anesthesia Plan    ASA Status:  2   NPO Status:  NPO Appropriate    Anesthesia Type: MAC.     - Reason for MAC: straight local not clinically adequate              Consents    Anesthesia Plan(s) and associated risks, benefits, and realistic alternatives discussed. Questions answered and patient/representative(s) expressed understanding.     - Discussed: Risks, Benefits and Alternatives for BOTH SEDATION and the PROCEDURE were discussed     - Discussed with:  Patient      - Extended Intubation/Ventilatory Support  Discussed: No.      - Patient is DNR/DNI Status: No    Use of blood products discussed: No .     Postoperative Care            Comments:    Other Comments: Risks and benefits of MAC anesthetic discussed including dental damage, aspiration, loss of airway, conversion to general anesthetic, CV complications, MI, stroke, death. Pt wishes to proceed.             MAX Madrid CRNA

## 2022-01-31 ENCOUNTER — OFFICE VISIT (OUTPATIENT)
Dept: FAMILY MEDICINE | Facility: OTHER | Age: 52
End: 2022-01-31
Attending: INTERNAL MEDICINE
Payer: COMMERCIAL

## 2022-01-31 DIAGNOSIS — Z11.52 ENCOUNTER FOR PREOPERATIVE SCREENING LABORATORY TESTING FOR COVID-19 VIRUS: Primary | ICD-10-CM

## 2022-01-31 DIAGNOSIS — Z01.812 ENCOUNTER FOR PREOPERATIVE SCREENING LABORATORY TESTING FOR COVID-19 VIRUS: Primary | ICD-10-CM

## 2022-01-31 PROCEDURE — U0005 INFEC AGEN DETEC AMPLI PROBE: HCPCS

## 2022-01-31 PROCEDURE — U0003 INFECTIOUS AGENT DETECTION BY NUCLEIC ACID (DNA OR RNA); SEVERE ACUTE RESPIRATORY SYNDROME CORONAVIRUS 2 (SARS-COV-2) (CORONAVIRUS DISEASE [COVID-19]), AMPLIFIED PROBE TECHNIQUE, MAKING USE OF HIGH THROUGHPUT TECHNOLOGIES AS DESCRIBED BY CMS-2020-01-R: HCPCS

## 2022-02-01 LAB — SARS-COV-2 RNA RESP QL NAA+PROBE: NEGATIVE

## 2022-02-04 ENCOUNTER — HOSPITAL ENCOUNTER (OUTPATIENT)
Facility: HOSPITAL | Age: 52
Discharge: HOME | End: 2022-02-04
Attending: INTERNAL MEDICINE | Admitting: INTERNAL MEDICINE
Payer: COMMERCIAL

## 2022-02-04 ENCOUNTER — APPOINTMENT (OUTPATIENT)
Dept: LAB | Facility: HOSPITAL | Age: 52
End: 2022-02-04
Attending: INTERNAL MEDICINE
Payer: COMMERCIAL

## 2022-02-04 ENCOUNTER — ANESTHESIA (OUTPATIENT)
Dept: SURGERY | Facility: HOSPITAL | Age: 52
End: 2022-02-04
Payer: COMMERCIAL

## 2022-02-04 VITALS
TEMPERATURE: 97.1 F | DIASTOLIC BLOOD PRESSURE: 84 MMHG | SYSTOLIC BLOOD PRESSURE: 116 MMHG | RESPIRATION RATE: 14 BRPM | OXYGEN SATURATION: 100 % | HEIGHT: 67 IN | WEIGHT: 137.2 LBS | HEART RATE: 75 BPM | BODY MASS INDEX: 21.53 KG/M2

## 2022-02-04 LAB — HCG UR QL: NEGATIVE

## 2022-02-04 PROCEDURE — 250N000009 HC RX 250: Performed by: NURSE ANESTHETIST, CERTIFIED REGISTERED

## 2022-02-04 PROCEDURE — 45378 DIAGNOSTIC COLONOSCOPY: CPT | Performed by: NURSE ANESTHETIST, CERTIFIED REGISTERED

## 2022-02-04 PROCEDURE — 710N000012 HC RECOVERY PHASE 2, PER MINUTE: Performed by: INTERNAL MEDICINE

## 2022-02-04 PROCEDURE — 81025 URINE PREGNANCY TEST: CPT | Performed by: NURSE ANESTHETIST, CERTIFIED REGISTERED

## 2022-02-04 PROCEDURE — 250N000011 HC RX IP 250 OP 636: Performed by: NURSE ANESTHETIST, CERTIFIED REGISTERED

## 2022-02-04 PROCEDURE — 370N000017 HC ANESTHESIA TECHNICAL FEE, PER MIN: Performed by: INTERNAL MEDICINE

## 2022-02-04 PROCEDURE — 258N000003 HC RX IP 258 OP 636: Performed by: NURSE ANESTHETIST, CERTIFIED REGISTERED

## 2022-02-04 PROCEDURE — 999N000141 HC STATISTIC PRE-PROCEDURE NURSING ASSESSMENT: Performed by: INTERNAL MEDICINE

## 2022-02-04 PROCEDURE — 360N000075 HC SURGERY LEVEL 2, PER MIN: Performed by: INTERNAL MEDICINE

## 2022-02-04 RX ORDER — SODIUM CHLORIDE, SODIUM LACTATE, POTASSIUM CHLORIDE, CALCIUM CHLORIDE 600; 310; 30; 20 MG/100ML; MG/100ML; MG/100ML; MG/100ML
INJECTION, SOLUTION INTRAVENOUS CONTINUOUS
Status: DISCONTINUED | OUTPATIENT
Start: 2022-02-04 | End: 2022-02-04 | Stop reason: HOSPADM

## 2022-02-04 RX ORDER — ONDANSETRON 4 MG/1
4 TABLET, ORALLY DISINTEGRATING ORAL EVERY 30 MIN PRN
Status: DISCONTINUED | OUTPATIENT
Start: 2022-02-04 | End: 2022-02-04 | Stop reason: HOSPADM

## 2022-02-04 RX ORDER — HYDRALAZINE HYDROCHLORIDE 20 MG/ML
2.5-5 INJECTION INTRAMUSCULAR; INTRAVENOUS EVERY 10 MIN PRN
Status: DISCONTINUED | OUTPATIENT
Start: 2022-02-04 | End: 2022-02-04 | Stop reason: HOSPADM

## 2022-02-04 RX ORDER — LIDOCAINE 40 MG/G
CREAM TOPICAL
Status: DISCONTINUED | OUTPATIENT
Start: 2022-02-04 | End: 2022-02-04 | Stop reason: HOSPADM

## 2022-02-04 RX ORDER — ONDANSETRON 2 MG/ML
4 INJECTION INTRAMUSCULAR; INTRAVENOUS EVERY 30 MIN PRN
Status: DISCONTINUED | OUTPATIENT
Start: 2022-02-04 | End: 2022-02-04 | Stop reason: HOSPADM

## 2022-02-04 RX ORDER — LIDOCAINE HYDROCHLORIDE 20 MG/ML
INJECTION, SOLUTION INFILTRATION; PERINEURAL PRN
Status: DISCONTINUED | OUTPATIENT
Start: 2022-02-04 | End: 2022-02-04

## 2022-02-04 RX ORDER — PROPOFOL 10 MG/ML
INJECTION, EMULSION INTRAVENOUS PRN
Status: DISCONTINUED | OUTPATIENT
Start: 2022-02-04 | End: 2022-02-04

## 2022-02-04 RX ORDER — LABETALOL 20 MG/4 ML (5 MG/ML) INTRAVENOUS SYRINGE
10
Status: DISCONTINUED | OUTPATIENT
Start: 2022-02-04 | End: 2022-02-04 | Stop reason: HOSPADM

## 2022-02-04 RX ADMIN — LIDOCAINE HYDROCHLORIDE 40 MG: 20 INJECTION, SOLUTION INFILTRATION; PERINEURAL at 10:39

## 2022-02-04 RX ADMIN — SODIUM CHLORIDE, POTASSIUM CHLORIDE, SODIUM LACTATE AND CALCIUM CHLORIDE: 600; 310; 30; 20 INJECTION, SOLUTION INTRAVENOUS at 09:48

## 2022-02-04 RX ADMIN — PROPOFOL 50 MG: 10 INJECTION, EMULSION INTRAVENOUS at 10:49

## 2022-02-04 RX ADMIN — PROPOFOL 50 MG: 10 INJECTION, EMULSION INTRAVENOUS at 10:46

## 2022-02-04 RX ADMIN — PROPOFOL 50 MG: 10 INJECTION, EMULSION INTRAVENOUS at 10:54

## 2022-02-04 RX ADMIN — PROPOFOL 50 MG: 10 INJECTION, EMULSION INTRAVENOUS at 11:02

## 2022-02-04 RX ADMIN — PROPOFOL 50 MG: 10 INJECTION, EMULSION INTRAVENOUS at 10:52

## 2022-02-04 RX ADMIN — PROPOFOL 50 MG: 10 INJECTION, EMULSION INTRAVENOUS at 10:43

## 2022-02-04 RX ADMIN — PROPOFOL 50 MG: 10 INJECTION, EMULSION INTRAVENOUS at 10:39

## 2022-02-04 RX ADMIN — PROPOFOL 50 MG: 10 INJECTION, EMULSION INTRAVENOUS at 10:58

## 2022-02-04 RX ADMIN — PROPOFOL 50 MG: 10 INJECTION, EMULSION INTRAVENOUS at 10:41

## 2022-02-04 RX ADMIN — PROPOFOL 50 MG: 10 INJECTION, EMULSION INTRAVENOUS at 10:40

## 2022-02-04 ASSESSMENT — MIFFLIN-ST. JEOR: SCORE: 1274.97

## 2022-02-04 NOTE — ANESTHESIA POSTPROCEDURE EVALUATION
Patient: Natasha Rios    Procedure: Procedure(s):  COLONOSCOPY       Diagnosis:Screen for colon cancer [Z12.11]  Diagnosis Additional Information: No value filed.    Anesthesia Type:  MAC    Note:  Disposition: Outpatient   Postop Pain Control: Uneventful            Sign Out: Well controlled pain   PONV: No   Neuro/Psych: Uneventful            Sign Out: Acceptable/Baseline neuro status   Airway/Respiratory: Uneventful            Sign Out: Acceptable/Baseline resp. status   CV/Hemodynamics: Uneventful            Sign Out: Acceptable CV status; No obvious hypovolemia; No obvious fluid overload   Other NRE: NONE   DID A NON-ROUTINE EVENT OCCUR? No           Last vitals:  Vitals Value Taken Time   /84 02/04/22 1130   Temp 97.1  F (36.2  C) 02/04/22 1109   Pulse 74 02/04/22 1129   Resp 14 02/04/22 1130   SpO2 99 % 02/04/22 1131   Vitals shown include unvalidated device data.    Electronically Signed By: MAX Cummings CRNA  February 4, 2022  11:53 AM

## 2022-02-04 NOTE — OP NOTE
Procedure Date: 2022    PRIMARY CARE PHYSICIAN:  MAX Armenta, CNP.    NAME OF PROCEDURE:  Colonoscopy.    PREPROCEDURE DIAGNOSIS:    1.  Colon cancer screening.  2.  Family medical history of colon cancer.    HISTORY OF PRESENT ILLNESS:  Please refer to H and P for further details.    PHYSICAL EXAMINATION:  Cardiopulmonary examination unchanged from previous exam.  Please refer to H and P for further details.    MEDICATIONS:  MAC per Anesthesia Service.  Monitoring parameters within normal limits throughout.    DESCRIPTION OF PROCEDURE:  After informed consent was obtained, the patient was placed in the left lateral decubitus position.  An adult video colonoscope was advanced all the way to the terminal ileum.  The terminal ileum mucosa was normal.  The patient has quite a tortuous and redundant colon and abdominal pressure, including scope rotation was utilized to reduce the colon.  Copious irrigation and suctioning during the slow withdrawal improved our views.  There were no masses, polyps, or any mucosal abnormalities.  A retroflexion view in the rectum showed small internal hemorrhoids, none of which were bleeding.  The air was then desufflated and scope was withdrawn fully and completely without any complications.    POSTPROCEDURE DIAGNOSES:  1.  Normal colonoscopy.  2.  Small internal hemorrhoids.    RECOMMENDATION:    1.  High fiber diet.  2.  Repeat colonoscopy in 5 years.  3.  Discharge home.    Thank you for allowing us to participate in her management.    Kash Velázquez MD        D: 2022   T: 2022   MT: [id number]    Name:     NAIN PACHECO  MRN:      [id number]        Account:        [id number]   :      1970           Procedure Date: 2022     Document: [id number]    cc:  MAX Armenta, CNP   Kash Velázquez MD

## 2022-02-04 NOTE — PLAN OF CARE
Patient and responsible adult given discharge instructions with no questions regarding instructions. Esther score 18/18. Pain level 0/10.  Discharged from unit via ambulation, tolerating PO diet. Patient discharged to home w/.

## 2022-02-04 NOTE — BRIEF OP NOTE
[facility]    Brief Operative Note    Pre-operative diagnosis: Screen for colon cancer [Z12.11]  Post-operative diagnosis Internal Hemorrhoids    Procedure: Procedure(s):  COLONOSCOPY  Surgeon: Surgeon(s) and Role:     * Kash Velázquez MD - Primary  Anesthesia: Monitor Anesthesia Care   Estimated Blood Loss: None    Drains: None  Specimens: * No specimens in log *  Findings:   None.  Complications: None.  Implants: * No implants in log *

## 2022-02-04 NOTE — LETTER
Natasha Rios  [address]     [facility] [address]      SURGERY PACKET            Your surgery is scheduled:    Date: 02/04/2022  ________________________________    Time: We will call 02/03/2022 afternoon with arrival time  ________________________________    Please arrive at:  [facility] Admitting - [facility] Entr[facility]  ______________________    Surgeon's Name: Dr. Velázquez  _______________________        Pre-Op Physical Fax Numbers:          Pre-Admissions  [phone]        Your surgery is located at:  [address] [facility] [address]         Before Your Surgery  For Patients and Visitors at [facility]    Welcome  As you get ready for surgery, you may have a lot of questions.  This brochure will help you know what to expect before and after surgery.  You and your family are the most important members of your health care team.  You will need to take an active role in your care.    Be sure to ask questions and learn all that you can about your surgery.  If you have any safety concerns, we urge you to tell a nurse as soon as possible.   This brochure is for information only.  It does not replace the advice of your doctor.  Always follow your doctor's advice.    Please tell us if you need a .    GETTING READY FOR SURGERY  Always follow your surgeon's instructions.  If you don't, your surgery could be canceled.  Please use the following checklist.    Within 30 Days of Surgery:    Have a pre-surgery physical exam with your family doctor or partner.    If you use a [facility], all of your information from the pre-op physical will be in the [employer] computer system.    Ask the doctor to send all of your results to the hospital before the surgery.  The doctor also may ask you to bring the results with you on the day of surgery or you can fax them to [phone].  Tell the doctor if:    You are allergic to latex or rubber  (Latex and  rubber gloves are often used in medical care).    You are taking any medicines (including aspirin), vitamins (Vitamin E, Fish Oil, Omegas) or herbal products.  You will need to stop taking some medicines before surgery.    You have any medical problems (allergies, diabetes or heart disease, for example).    You have a pacemaker or an AICD (automatic implanted cardiac defibrillator).  If you do, please bring the ID card with you on the day of surgery.    You are a smoker.  People who smoke have a higher risk of infection after surgery.  Ask your doctor how you can quit smoking.  Within 7 days of Surgery:  Prior to your surgical procedure, a nurse will be contacting you to obtain a health history. A nurse will call you within a few days of surgery to go over these and other instructions.  If you do not hear from them, please call them at [phone].        Call your insurance company.  Ask if you need pre-approval for your surgery.  If you do not have insurance, please let us know.      Arrange for someone to drive you home after surgery.  If you will have same-day surgery, you may not drive or take public transportation home by yourself.    Arrange for someone to stay with you for 24 hours after you go home.  This person must be a responsible adult (ie- Family member or friend).  The Day Before Surgery:     Call your surgeon if there are any changes in your health.  This includes signs of a cold or flu (such as a sore throat, runny nose, cough, rash or fever).    Do not smoke, drink alcohol or take over-the-counter medicine (unless your surgeon tells you to) for 24 hours before and after surgery.    If you take prescribed drugs:  You may need to stop them until after the surgery.  Follow your doctor's orders.  You may resume Aspirin and/or blood thinners after your surgery as directed by your physician/surgeon.    NO FOOD OR DRINK AFTER MIDNIGHT.  Follow your surgeon's orders for eating and drinking.  You need to  have an empty stomach before surgery.  This will make the surgery as safe as possible.  If you don't follow your doctor's orders, your surgery could be changed to another date.    The Day of Surgery:    Take a shower or bath the night before and the morning of surgery.  Use regular soap.  Gently clean the skin.     Please remove deodorant, cologne, scented lotion, makeup, nail polish and jewelry (including rings and body piercings).  If you wear artificial nails, please remove at least one nail before coming to the hospital.    Wear clean, loose clothing to the hospital.    Bring these items to the hospital:  1. Your insurance card.  2. A list of all the medicines you take.  Include vitamins, minerals, herbs and over-the-counter drugs.  Note any drug allergies.  3. A copy of your advance health care directive, if you have one.  This tells us what treatment you would want -- and who would make health care decisions -- if you could no longer speak for yourself.  You may request this form in advance or download it from [email or web address].  4. A case for any glasses, contact lenses, hearing aids or dentures.  5. Your inhaler or CPAP machine, if you use these at home.  Leave extra cash, jewelry and other valuables at home.    When You Arrive:  When you get to the hospital, you will:    Check in.  If you are under age 18, you must be with a parent or legal guardian.    Electronically sign consent forms, if you haven't already.  These electronic forms state that you know the risks and benefits of surgery.  When you sign the forms, you give us permission to do the surgery.  Do not sign them unless you understand what will happen during and after your surgery.  If you have any questions about your surgery, ask to speak with your doctor before you sign the forms.  If you don't understand the answers, ask again.    Receive a copy of the Patients Bill of Rights.  If you do not receive a copy, please ask for one.    Change  "into hospital clothes.  Your belongings will be placed in a bag.  We will return them to you after surgery.    Meet with the anesthesia provider.  He or she will tell you what kind of anesthesia (medicine) will be used to keep you comfortable during surgery.  Remember: It's okay to remind doctors and nurses to wash their hands before touching you.   In most cases, your surgeon will use a marker to write his or her initials on the surgery site.  This ensures that the exact site is operated on.  For safety reasons, we will ask you the same questions many times.  For example, we may ask your name and birth date over and over again.  Friends and family can stay with you until it's time for surgery.  While you're in surgery, they will be in the waiting area.  Please note that cell phones are not allowed in some patient care areas.  If you have questions about what will happen in the operating room, talk to your care team.  After Surgery:    We will move you to a recovery room where we will watch you closely.  If you have any pain or discomfort, tell your nurse.  He or she will try to make you comfortable.      If you are staying overnight we will move you to your hospital room after you are awake.    If you are going home we will move you to another room.  Friends and family may be able to join you.  The length of time you spend in recovery depends on the type of medicine you received, your medical condition, and the type of surgery you had.  Dealing with pain:  A nurse will check your comfort level often during your stay.  He or she will work with you to manage your pain.  Remember:    All pain is real.  There are many ways to control pain.  We can help you decide what works best for you.    Ask for pain medicine when you need it.  Don't try to \"tough it out\" -- this can make you feel worse.  Always take your medicine as ordered.    Medicine doesn't work the same for everyone.  If your medicine isn't working tell your " nurse.  There may be other medicines or treatments we can try.  Going Home:  We will let you know when you're ready to leave the hospital.  Before you leave, we will tell you how to care for yourself at home and prevent infections.  If you do not understand something, please say so.  We will answer any questions you have.  We will then help you get ready to leave.  You must have an adult with you for the first 24 hours after you leave the hospital. Take it easy when you get home.  You will need some time to recover -- you may be more tired than you realize at first.  Rest and relax for at least the first 24 hours at home.  You'll feel better and heal faster if you take good care of yourself.                      Thank you for following these important instructions.      You have been scheduled for surgery and we would like to give you some information that will assist in helping get the best possible outcome.      Before Surgery:   If for any reason you decide not to have the surgery, please contact our office.  We can easily cancel or reschedule the procedure. Please call the  at [phone] or after hours [phone]      Any pain related to the surgery that occurs before the surgery needs to be reported and managed by your primary care or referring doctor.      Please keep in mind that the time of surgery is subject to change.  Make sure you have nothing to eat or drink after midnight.  If your surgery is later in the afternoon, this recommendation might change, but not until the day before surgery after the actual time of the surgery has been established.    After Surgery:  When you are discharged from the recovery room, the nurses will review instructions with you and your caregiver.      Please wash your hands every time you touch the wound or change bandages or dressings.      Do not submerge the wound in water.  You may not use a bathtub or hot tub until the wound is closed.  The wait time frame  is generally 2-3 weeks but any open area can be a source of incoming bacteria, so it is better to be on the safe side and avoid the tub until your wound is fully healed.      You may take a shower 24 hours after surgery.  Double check with your surgeon if it is ok for water to run over a wound, whether it has been sutured, stapled, glued or is open.  You may gently wash the wound using the antiseptic soap provided for your pre-surgery showering (do not use a washcloth).  Any mild soap will work as well.      Many surgical wounds will have small white strips of tape on them called Steri Strips.  Do not remove these.  The edges will curl and fall off within 7-10 days with normal showering.      If you are going home with sutures (stitches) or staples, you must return to the clinic to have them taken out, usually within 1-2 weeks.      Signs and symptoms of infection include:  1. Fever, temperature over 101.5 ' F  2. Redness  3. Swelling  4. Increasing pain  5. Green or yellow drainage which may or may not have a foul odor.    Symptoms of infection need to be reported to your surgery office. Please call your Surgeon.      If you or your  are deaf or hard of hearing, or prefer a language other than English, please let us know.  We have many free services, including interpreters and other aids to help you communicate. You may ask for help  through any member of your care team or by calling Language Services at [phone], option 2.

## 2022-02-04 NOTE — H&P
Pre-Endoscopy History and Physical     Natasha Rios MRN# [id number]   YOB: 1970 Age: 51 year old     Primary care provider: Aarti Villanueva  Type of Endoscopy: Colonoscopy with possible biopsy, possible polypectomy  Reason for Procedure: Family history of CRC - MGM  Type of Anesthesia Anticipated: MAC    HPI:    Natasha is a 51 year old female who will be undergoing the above procedure.      A history and physical has been performed. The patient's medications and allergies have been reviewed. The risks and benefits of the procedure and the sedation options and risks were discussed with the patient.  All questions were answered and informed consent was obtained.      She denies a personal or family history of anesthesia complications or bleeding disorders.     Patient Active Problem List   Diagnosis     Rosacea     CARDIOVASCULAR SCREENING; LDL GOAL LESS THAN 160     Anxiety     HGSIL on cytologic smear of cervix--pap normal-1/2019, Now HGSIL--2/2020     Cervical high risk HPV (human papillomavirus) test positive-HRhpv+---1/2019, 2/2020     Status post colposcopy for HGSIL & HRhpv+--2/2020,   ECC is negative---2/2020     HGSIL on Pap smear of cervix        Past Medical History:   Diagnosis Date     Abnormal Pap smear     at age 21 yrs, had colposcopy with biospy. Normal      Anxiety     on citalopram since 2010     Basal cell cancer 2013    removed forehead     Rosacea      S/P breast implant, saline 2006    normal mammogram in 2007        Past Surgical History:   Procedure Laterality Date     CL AFF SURGICAL PATHOLOGY      at age 21 yrs, for abnormal pap. normal psp since then     CONIZATION LEEP N/A 6/10/2020    Procedure: LOOP ELECTROSURGICAL EXCISION PROCEDURE;  Surgeon: Leo Alonzo MD;  Location: [facility]     LASIK       TONSILLECTOMY       [facility] NONSPECIFIC PROCEDURE  2006    breast implants        Social History     Tobacco Use     Smoking status: Never Smoker     Smokeless tobacco:  "Never Used   Substance Use Topics     Alcohol use: Not on file       Family History   Problem Relation Age of Onset     Cancer Mother         liver       Prior to Admission medications    Not on File       No Known Allergies     REVIEW OF SYSTEMS:      ROS: 10 point ROS neg other than the symptoms noted above.     PHYSICAL EXAM:   /79   Temp 98.4  F (36.9  C) (Tympanic)   Resp 16   Ht 1.702 m (5' 7\")   Wt 62.2 kg (137 lb 3.2 oz)   LMP 01/24/2022 (Approximate)   SpO2 99%   BMI 21.49 kg/m   Estimated body mass index is 21.49 kg/m  as calculated from the following:    Height as of this encounter: 1.702 m (5' 7\").    Weight as of this encounter: 62.2 kg (137 lb 3.2 oz).   GENERAL APPEARANCE: alert, and oriented  MENTAL STATUS: alert  AIRWAY EXAM: Mallampatti Class II (visualization of the soft palate, fauces, and uvula)  RESP: lungs clear to auscultation - no rales, rhonchi or wheezes  CV: regular rates and rhythm  DIAGNOSTICS:    Not indicated    IMPRESSION   ASA Class 2 - Mild systemic disease    PLAN:   Plan for Colonoscopy with possible biopsy, possible polypectomy. We discussed the risks, benefits and alternatives and the patient wished to proceed.    The above has been forwarded to the consulting provider.      Signed Electronically by: Kash Velázquez MD  February 4, 2022          "

## 2022-02-04 NOTE — DISCHARGE INSTRUCTIONS

## 2022-02-04 NOTE — ANESTHESIA CARE TRANSFER NOTE
Patient: Natasha Rios    Procedure: Procedure(s):  COLONOSCOPY       Diagnosis: Screen for colon cancer [Z12.11]  Diagnosis Additional Information: No value filed.    Anesthesia Type:   MAC     Note:    Oropharynx: oropharynx clear of all foreign objects  Level of Consciousness: awake  Oxygen Supplementation: room air    Independent Airway: airway patency satisfactory and stable  Dentition: dentition unchanged  Vital Signs Stable: post-procedure vital signs reviewed and stable  Report to RN Given: handoff report given  Patient transferred to: Phase II    Handoff Report: Identifed the Patient, Identified the Reponsible Provider, Reviewed the pertinent medical history, Discussed the surgical course, Reviewed Intra-OP anesthesia mangement and issues during anesthesia, Set expectations for post-procedure period and Allowed opportunity for questions and acknowledgement of understanding      Vitals:  Vitals Value Taken Time   BP     Temp     Pulse     Resp     SpO2         Electronically Signed By: MAX Cummings CRNA  February 4, 2022  11:07 AM

## 2022-02-13 ENCOUNTER — HEALTH MAINTENANCE LETTER (OUTPATIENT)
Age: 52
End: 2022-02-13

## 2022-10-16 ENCOUNTER — HEALTH MAINTENANCE LETTER (OUTPATIENT)
Age: 52
End: 2022-10-16

## 2023-03-26 ENCOUNTER — HEALTH MAINTENANCE LETTER (OUTPATIENT)
Age: 53
End: 2023-03-26

## 2024-04-02 ENCOUNTER — OFFICE VISIT (OUTPATIENT)
Dept: OBGYN | Facility: OTHER | Age: 54
End: 2024-04-02
Attending: OBSTETRICS & GYNECOLOGY
Payer: COMMERCIAL

## 2024-04-02 VITALS
RESPIRATION RATE: 16 BRPM | OXYGEN SATURATION: 100 % | SYSTOLIC BLOOD PRESSURE: 104 MMHG | DIASTOLIC BLOOD PRESSURE: 72 MMHG | WEIGHT: 131.3 LBS | HEART RATE: 56 BPM | BODY MASS INDEX: 20.61 KG/M2 | HEIGHT: 67 IN

## 2024-04-02 DIAGNOSIS — Z11.51 SCREENING FOR HUMAN PAPILLOMAVIRUS: ICD-10-CM

## 2024-04-02 DIAGNOSIS — Z87.410 HISTORY OF CERVICAL DYSPLASIA: Primary | ICD-10-CM

## 2024-04-02 DIAGNOSIS — Z01.419 NORMAL PELVIC EXAM: ICD-10-CM

## 2024-04-02 PROCEDURE — 87624 HPV HI-RISK TYP POOLED RSLT: CPT | Performed by: OBSTETRICS & GYNECOLOGY

## 2024-04-02 PROCEDURE — 99213 OFFICE O/P EST LOW 20 MIN: CPT | Performed by: OBSTETRICS & GYNECOLOGY

## 2024-04-02 PROCEDURE — G0123 SCREEN CERV/VAG THIN LAYER: HCPCS | Performed by: OBSTETRICS & GYNECOLOGY

## 2024-04-02 ASSESSMENT — PAIN SCALES - GENERAL: PAINLEVEL: NO PAIN (0)

## 2024-04-04 NOTE — PROGRESS NOTES
S:  f/up cervical dysplasia    Natasha Rios is a 53year old female (P2, still with regular periods)   who presents surveillance pap smear for h/o CIN3.     LEEP with DORIE 3 on 6/20.    She denies AUB, pelvic pain or GYN complaints.  See my prior evals. She had tow normal Paps post procedure with neg HPV.  LPS 2021    Mammo UTD ([facility])         Patient Active Problem List   Diagnosis    Rosacea    CARDIOVASCULAR SCREENING; LDL GOAL LESS THAN 160    Anxiety    HGSIL on cytologic smear of cervix--pap normal-1/2019, Now HGSIL--2/2020    Cervical high risk HPV (human papillomavirus) test positive-HRhpv+---1/2019, 2/2020    Status post colposcopy for HGSIL & HRhpv+--2/2020,   ECC is negative---2/2020    HGSIL on Pap smear of cervix            Past Medical History:   Diagnosis Date    Abnormal Pap smear     at age 21 yrs, had colposcopy with biospy. Normal     Anxiety     on citalopram since 2010    Basal cell cancer 2013    removed forehead    Rosacea     S/P breast implant, saline 2006    normal mammogram in 2007            Past Surgical History:   Procedure Laterality Date    CL AFF SURGICAL PATHOLOGY      at age 21 yrs, for abnormal pap. normal psp since then    COLONOSCOPY N/A 2/4/2022    Procedure: COLONOSCOPY;  Surgeon: Kash Velázquez MD;  Location: [facility]    CONIZATION LEEP N/A 6/10/2020    Procedure: LOOP ELECTROSURGICAL EXCISION PROCEDURE;  Surgeon: Leo Alonzo MD;  Location: [facility]    LASIK      TONSILLECTOMY      ZZC NONSPECIFIC PROCEDURE  2006    breast implants             Social History     Tobacco Use    Smoking status: Never    Smokeless tobacco: Never   Substance Use Topics    Alcohol use: Not on file            Family History   Problem Relation Age of Onset    Cancer Mother         liver             No Known Allergies         No current outpatient medications on file.          Review Of Systems  Constitutional:  Denies fever  GI/ negative except as noted per hpi    O:   /72 (BP Location:  "Left arm, Patient Position: Sitting, Cuff Size: Adult Regular)   Pulse 56   Resp 16   Ht 1.702 m (5' 7\")   Wt 59.6 kg (131 lb 4.8 oz)   SpO2 100%   BMI 20.56 kg/m    Gen:  NAD, A and O  Abd soft, NT, ND  Lymphadenopathy:  neg  Vulva: No lesions, erythema, BUS wnl  Vagina: Pink, moist mucosa with rugae,no lesions  Cervix: No lesions, no CMT  Uterus: Midposition, normal size and count our, mobile, NT  Adnexa: Non-palpable, NT, no masses  Anus without lesions  Ext.  neg   Pap done         A:  Surveillance pap smear.  H/o high grade cervical DPA  Screening pelvic exam    P:  Await Pap results.  If nml, Neg HPV then repeat, routine screeing in 3 yrs.  Otherwise f/up based on results.   . Pt has my card and phone number to call as needed if problems in the interim or she does not hear her results.     "

## 2024-04-08 LAB
BKR LAB AP GYN ADEQUACY: NORMAL
BKR LAB AP GYN INTERPRETATION: NORMAL
BKR LAB AP HPV REFLEX: NORMAL
BKR LAB AP PREVIOUS ABNL DX: NORMAL
BKR LAB AP PREVIOUS ABNORMAL: NORMAL
PATH REPORT.COMMENTS IMP SPEC: NORMAL
PATH REPORT.COMMENTS IMP SPEC: NORMAL
PATH REPORT.RELEVANT HX SPEC: NORMAL

## 2024-04-09 LAB
HUMAN PAPILLOMA VIRUS 16 DNA: NEGATIVE
HUMAN PAPILLOMA VIRUS 18 DNA: NEGATIVE
HUMAN PAPILLOMA VIRUS FINAL DIAGNOSIS: NORMAL
HUMAN PAPILLOMA VIRUS OTHER HR: NEGATIVE

## 2024-06-01 ENCOUNTER — HEALTH MAINTENANCE LETTER (OUTPATIENT)
Age: 54
End: 2024-06-01

## 2025-04-12 ENCOUNTER — HEALTH MAINTENANCE LETTER (OUTPATIENT)
Age: 55
End: 2025-04-12

## 2025-05-08 ENCOUNTER — APPOINTMENT (OUTPATIENT)
Dept: ULTRASOUND IMAGING | Facility: HOSPITAL | Age: 55
End: 2025-05-08
Payer: COMMERCIAL

## 2025-05-08 ENCOUNTER — HOSPITAL ENCOUNTER (EMERGENCY)
Facility: HOSPITAL | Age: 55
Discharge: HOME | End: 2025-05-08
Payer: COMMERCIAL

## 2025-05-08 VITALS
HEART RATE: 76 BPM | OXYGEN SATURATION: 100 % | DIASTOLIC BLOOD PRESSURE: 80 MMHG | SYSTOLIC BLOOD PRESSURE: 147 MMHG | TEMPERATURE: 99.1 F | RESPIRATION RATE: 16 BRPM

## 2025-05-08 DIAGNOSIS — E07.9 MASS OF THYROID GLAND: ICD-10-CM

## 2025-05-08 DIAGNOSIS — R22.1 NECK MASS: ICD-10-CM

## 2025-05-08 DIAGNOSIS — E05.90 HYPERTHYROIDISM: ICD-10-CM

## 2025-05-08 LAB
HOLD SPECIMEN: NORMAL
T4 FREE SERPL-MCNC: 1.93 NG/DL (ref 0.9–1.7)
TSH SERPL DL<=0.005 MIU/L-ACNC: 0.04 UIU/ML (ref 0.3–4.2)

## 2025-05-08 PROCEDURE — 76536 US EXAM OF HEAD AND NECK: CPT | Mod: 26 | Performed by: RADIOLOGY

## 2025-05-08 PROCEDURE — G0463 HOSPITAL OUTPT CLINIC VISIT: HCPCS

## 2025-05-08 PROCEDURE — 76536 US EXAM OF HEAD AND NECK: CPT

## 2025-05-08 PROCEDURE — 99213 OFFICE O/P EST LOW 20 MIN: CPT

## 2025-05-08 PROCEDURE — 36415 COLL VENOUS BLD VENIPUNCTURE: CPT

## 2025-05-08 PROCEDURE — 84443 ASSAY THYROID STIM HORMONE: CPT

## 2025-05-08 PROCEDURE — 84439 ASSAY OF FREE THYROXINE: CPT

## 2025-05-08 ASSESSMENT — ENCOUNTER SYMPTOMS
RHINORRHEA: 0
MUSCULOSKELETAL NEGATIVE: 1
FACIAL SWELLING: 0
EYES NEGATIVE: 1
VOICE CHANGE: 0
SORE THROAT: 0
TROUBLE SWALLOWING: 0
CARDIOVASCULAR NEGATIVE: 1
SINUS PAIN: 0
NEUROLOGICAL NEGATIVE: 1
GASTROINTESTINAL NEGATIVE: 1
SINUS PRESSURE: 0
CONSTITUTIONAL NEGATIVE: 1
RESPIRATORY NEGATIVE: 1

## 2025-05-08 ASSESSMENT — COLUMBIA-SUICIDE SEVERITY RATING SCALE - C-SSRS
2. HAVE YOU ACTUALLY HAD ANY THOUGHTS OF KILLING YOURSELF IN THE PAST MONTH?: NO
6. HAVE YOU EVER DONE ANYTHING, STARTED TO DO ANYTHING, OR PREPARED TO DO ANYTHING TO END YOUR LIFE?: NO
1. IN THE PAST MONTH, HAVE YOU WISHED YOU WERE DEAD OR WISHED YOU COULD GO TO SLEEP AND NOT WAKE UP?: NO

## 2025-05-08 ASSESSMENT — ACTIVITIES OF DAILY LIVING (ADL)
ADLS_ACUITY_SCORE: 41

## 2025-05-08 NOTE — ED PROVIDER NOTES
History     Chief Complaint   Patient presents with    Mass    Otalgia     The history is provided by the patient.     Natasha Lopez is a 54 year old female who presents for evaluation after abnormal examination during virtual visit. She was seen for evaluation of a unilateral mass on anterior neck which she noticed about 3 weeks ago.     Allergies:  No Known Allergies    Problem List:    Patient Active Problem List    Diagnosis Date Noted    HGSIL on Pap smear of cervix 02/24/2020     Priority: Medium     Added automatically from request for surgery [id number]      HGSIL on cytologic smear of cervix--pap normal-1/2019, Now HGSIL--2/2020 02/21/2020     Priority: Medium    Cervical high risk HPV (human papillomavirus) test positive-HRhpv+---1/2019, 2/2020 02/21/2020     Priority: Medium    Status post colposcopy for HGSIL & HRhpv+--2/2020,   ECC is negative---2/2020 02/21/2020     Priority: Medium    Anxiety      Priority: Medium     on citalopram since 2010      CARDIOVASCULAR SCREENING; LDL GOAL LESS THAN 160 10/31/2010     Priority: Medium    Rosacea      Priority: Medium        Past Medical History:    Past Medical History:   Diagnosis Date    Abnormal Pap smear     Anxiety     Basal cell cancer 2013    Rosacea     S/P breast implant, saline 2006       Past Surgical History:    Past Surgical History:   Procedure Laterality Date    CL AFF SURGICAL PATHOLOGY      at age 21 yrs, for abnormal pap. normal psp since then    COLONOSCOPY N/A 2/4/2022    Procedure: COLONOSCOPY;  Surgeon: Kash Velázquez MD;  Location: [facility]    CONIZATION LEEP N/A 6/10/2020    Procedure: LOOP ELECTROSURGICAL EXCISION PROCEDURE;  Surgeon: Leo Alonzo MD;  Location: [facility]    LASIK      TONSILLECTOMY      [facility] NONSPECIFIC PROCEDURE  2006    breast implants        Family History:    Family History   Problem Relation Age of Onset    Cancer Mother         liver       Social History:  Marital Status:   [2]  Social History     Tobacco Use     Smoking status: Never    Smokeless tobacco: Never   Substance Use Topics    Alcohol use: Yes     Comment: Moderate    Drug use: Not Currently        Medications:    No current outpatient medications on file.        Review of Systems   Constitutional: Negative.    HENT:  Positive for postnasal drip. Negative for congestion, facial swelling, rhinorrhea, sinus pressure, sinus pain, sneezing, sore throat, tinnitus, trouble swallowing and voice change.    Eyes: Negative.    Respiratory: Negative.     Cardiovascular: Negative.    Gastrointestinal: Negative.    Genitourinary: Negative.    Musculoskeletal: Negative.    Neurological: Negative.        Physical Exam   BP: (!) 147/80  Pulse: 76  Temp: 99.1  F (37.3  C)  Resp: 16  SpO2: 100 %      Physical Exam  Vitals and nursing note reviewed.   Constitutional:       General: She is not in acute distress.     Appearance: Normal appearance. She is not ill-appearing, toxic-appearing or diaphoretic.   HENT:      Head: Normocephalic and atraumatic.   Neck:      Thyroid: Thyroid mass present.      Vascular: No JVD.      Trachea: Trachea normal.        Comments: Singular Hard, non-mobile mass palpated on anterior neck. No cervical lymphadenopathy palpated.   Airway is patent but I am concerned about airway impingement  Pulmonary:      Effort: Pulmonary effort is normal. No respiratory distress.      Breath sounds: Normal breath sounds. No wheezing or rales.   Lymphadenopathy:      Cervical: No cervical adenopathy.   Skin:     General: Skin is warm and dry.   Neurological:      General: No focal deficit present.      Mental Status: She is alert and oriented to person, place, and time. Mental status is at baseline.   Psychiatric:         Behavior: Behavior normal.         ED Course            Results for orders placed or performed during the hospital encounter of 05/08/25 (from the past 24 hours)   TSH with free T4 reflex   Result Value Ref Range    TSH 0.04 (L) 0.30 - 4.20 uIU/mL    Extra Tube    Narrative    The following orders were created for panel order Extra Tube.  Procedure                               Abnormality         Status                     ---------                               -----------         ------                     Extra Blue Top Tube[[id number]]                             Final result               Extra Red Top Tube[[id number]]                              Final result               Extra Purple Top Tube[[id number]]                           Final result               Extra Heparinized Syringe[[id number]]                       Final result                 Please view results for these tests on the individual orders.   Extra Blue Top Tube   Result Value Ref Range    Hold Specimen JIC    Extra Red Top Tube   Result Value Ref Range    Hold Specimen JIC    Extra Purple Top Tube   Result Value Ref Range    Hold Specimen JIC    Extra Heparinized Syringe   Result Value Ref Range    Hold Specimen JIC    T4 free   Result Value Ref Range    Free T4 1.93 (H) 0.90 - 1.70 ng/dL   US Thyroid    Narrative    Exam: US THYROID, Date    Exam reason: non-mobile palpable mass on anterior right aspect of  neck, concern for thyroid mass; Neck mass    Technique: Ultrasound examination of the thyroid and adjacent soft  tissues was performed.    Comparison: No relevant prior imaging.    FINDINGS:    RIGHT LOBE:    Right lobe measures 4.5 x 1.9 x 2.5 cm.  Texture: Minimal normal-appearing tissue located along the peripheral  aspects of the right lobe..    Nodule #: 1. Location: Large nodule/mass replaces nearly the entire  right lobe. Minimal volume of normal-appearing parenchyma is seen  about the periphery of the right lobe..   Size: 4.0 x 1.8 x 2.3 cm. Change in size: Baseline examination  Composition: solid/almost completely solid (2).  Echogenicity: hypoechoic (2).  Shape: not taller-than-wide (0)  Margins: lobulated/irregular (2).  Echogenic foci: punctate echogenic foci (3).    ACR  "TI-RADS total points: 9.   ACR TI-RADS risk category: TR5 ( 7 points or greater).    LEFT LOBE:  Left lobe measures 3.9 x 0.9 x 1.6 cm.  Texture: Normal.      ISTHMUS:   Thickness: 3.6 mm.    LYMPH NODES: No evidence of pathologic lymph node enlargement.    ADDITIONAL COMMENTS:      Impression    IMPRESSION:  4 x 2 cm TR 5 right thyroid mass is highly concerning for malignant  neoplasm.    No evidence of pathologic lymph node enlargement.    ACR TI-RADS recommendations:   TR5 (?7 points) -FNA if ? 1cm, follow-up if 0.5 -0.9 cm every year for  5 years  TR4 (4-6 points) -FNA if ? 1.5cm, follow-up if 1 -1.4 cm in 1, 2, 3  and 5 years  TR3 (3 points)-FNA if ? 2.5cm, follow-up if 1.5 -2.4 cm in 1, 3 and 5  years  TR2 (2 points) & TR1 (0 points) -No FNA or follow-up    MATEO PEARSON MD         SYSTEM ID:  [id number]       Medications - No data to display    Assessments & Plan (with Medical Decision Making)     I have reviewed the nursing notes.    I have reviewed the findings, diagnosis, plan and need for follow up with the patient.  MDM: Gabriella is a well appearing 54 year old female who presents for review after a virtual visit today. The virtual visit provider recommended she come to urgent care to follow up as results in virtual visit were inconclusive (as they could not palpate the area and were concerned). She reports finding a singular \"lump\" in her anterior neck which she noticed about 3 weeks ago. She does not report that the lump is painful but is noticing surrounding area discomfort. Airway is patent and she is able to swallow, but she does report now noticing the mass while she swallows food. One singular, non-mobile hard mass is felt on anterior neck. No cervical lymphadenopathy or other lymphadenopathy noted. Concern for thyroid mass present. Lungs clear. Non-labored breathing. No reports of changing in breathing pattern. No chest pain, SOB, abdominal pain, nausea, vomiting.     Most recent TSH in March was " 1.899 at [facility]. Recheck today 0.04. will require follow up in clinic for medical management.     Thyroid US revealed 4 x 2 cm mass highly suspicious for malignancy. I am concerned for airway impingement and more diagnostic testing will be required urgently outpatient.     Discussed results in-depth with Gabriella and answered all questions. She verbalized understanding of information, but it was apparent she was in shock and did not absorb all information I had discussed with her. We discussed and viewed her imaging, her TSH level, what the next steps are and when she needs to follow up. Her PCP is changing locations and is not currently practicing - I will ensure she has a follow up scheduled prior to leaving today.  Due to a change in her PCP's practice location, I am unable to schedule a follow up in clinic at this time. I spoke to [facility] and [facility] scheduling - both clinics stated they would call her in the morning to schedule urgent follow up needed within the next week.     She does report rhinorrhea which developed yesterday but no other URI or viral infection symptoms.     (E05.90) Hyperthyroidism  (E07.9) Mass of thyroid gland  (R22.1) Neck mass  Comment: unable to obtain follow up at this time. Please wait for calls from [facility] and [facility] to schedule follow up.  Plan: US Thyroid, TSH with free T4 reflex, T4 free    There are no discharge medications for this patient.      Final diagnoses:   Neck mass   Hyperthyroidism   Mass of thyroid gland       5/8/2025   [facility]  MAX Walton CNP, Megan, APRN CNP  05/08/25 2036

## 2025-05-08 NOTE — DISCHARGE INSTRUCTIONS
[facility] will call you in the morning to schedule follow up for thyroid mass within the next week    [facility] will also be calling to see if there is a provider available to see you in this system

## 2025-05-08 NOTE — ED TRIAGE NOTES
Patient presents with c/o pain in front of neck, hurts to the touch, hurts when swallowing, hurts when I cough X3 weeks. Also reports right ear pain and having bronchitis recently.

## 2025-06-14 ENCOUNTER — HEALTH MAINTENANCE LETTER (OUTPATIENT)
Age: 55
End: 2025-06-14

## (undated) DEVICE — TUBING-SUCTION 20FT

## (undated) DEVICE — CAUTERY-LEEP SQUARE 1.0 X 1.0CM X 12CM SHAFT

## (undated) DEVICE — CATH-URETHRAL 14FR

## (undated) DEVICE — CANISTER-SUCTION 2000CC

## (undated) DEVICE — FLUID TRAP FOR VIROSAFE FILTERS

## (undated) DEVICE — SYRINGE-ASEPTO IRRIGATION

## (undated) DEVICE — SPONGE-LAPAROTOMY PADS 18 X 18

## (undated) DEVICE — CAUTERY-LEEP-MED 1.5CM X 0.7CM LOOP

## (undated) DEVICE — CAUTERY-BALL 5MM LONG SHAFT

## (undated) DEVICE — IRRIGATION-H2O 1000ML

## (undated) DEVICE — NDL-SPINAL 22G X 3.5IN QUINCKE

## (undated) DEVICE — CAUTERY PENCIL-SMOKE EVACUATION

## (undated) DEVICE — LABEL-STERILE PREPRINTED FOR OR

## (undated) DEVICE — BLADE-SCALPEL #11

## (undated) DEVICE — SUCTION TUBE-YANKAUR

## (undated) DEVICE — SANITARY NAPKINS-SINGLE

## (undated) DEVICE — LIGHT HANDLE COVER

## (undated) DEVICE — CAUTERY TIP CLEANER

## (undated) DEVICE — DRSG-NON ADHERING 3 X 8 TELFA

## (undated) DEVICE — GOWN-SURG XL LVL 3 REINFORCED

## (undated) DEVICE — DRSG-SPONGE X-RAY 4 X 4

## (undated) DEVICE — LUBRICANT JELLY 2OZ. TUBE

## (undated) DEVICE — DRAPE-STERI 45X60CM #1010

## (undated) DEVICE — GLV-8.5 BIOGEL LATEX

## (undated) DEVICE — NDL COUNTER-20-40 CT MAGNET/FOAM BLOCK

## (undated) DEVICE — CAUTERY-LEEP-LARGE 2.0CM X 0.8CM

## (undated) DEVICE — IRRIGATION-NACL 1000ML

## (undated) DEVICE — FILTER-IN-LINE WALL SUCTION 0.5 MICRON

## (undated) DEVICE — TRAY-SKIN PREP POVIDONE/IODINE

## (undated) DEVICE — CONNECTOR-ERBEFLO 2 PORT

## (undated) DEVICE — CAUTERY PAD-POLYHESIVE II ADULT

## (undated) DEVICE — PACK-GYN CYSTO-CUSTOM

## (undated) RX ORDER — FENTANYL CITRATE 50 UG/ML
INJECTION, SOLUTION INTRAMUSCULAR; INTRAVENOUS
Status: DISPENSED
Start: 2020-06-10

## (undated) RX ORDER — PROPOFOL 10 MG/ML
INJECTION, EMULSION INTRAVENOUS
Status: DISPENSED
Start: 2020-06-10

## (undated) RX ORDER — LIDOCAINE HYDROCHLORIDE 20 MG/ML
INJECTION, SOLUTION EPIDURAL; INFILTRATION; INTRACAUDAL; PERINEURAL
Status: DISPENSED
Start: 2022-02-04

## (undated) RX ORDER — PROPOFOL 10 MG/ML
INJECTION, EMULSION INTRAVENOUS
Status: DISPENSED
Start: 2022-02-04

## (undated) RX ORDER — LIDOCAINE HYDROCHLORIDE 20 MG/ML
INJECTION, SOLUTION EPIDURAL; INFILTRATION; INTRACAUDAL; PERINEURAL
Status: DISPENSED
Start: 2020-06-10